# Patient Record
Sex: FEMALE | Race: WHITE | NOT HISPANIC OR LATINO | URBAN - METROPOLITAN AREA
[De-identification: names, ages, dates, MRNs, and addresses within clinical notes are randomized per-mention and may not be internally consistent; named-entity substitution may affect disease eponyms.]

---

## 2017-12-19 ENCOUNTER — OUTPATIENT (OUTPATIENT)
Dept: OUTPATIENT SERVICES | Facility: HOSPITAL | Age: 82
LOS: 1 days | End: 2017-12-19
Payer: MEDICARE

## 2017-12-19 PROCEDURE — 76641 ULTRASOUND BREAST COMPLETE: CPT

## 2017-12-19 PROCEDURE — 76641 ULTRASOUND BREAST COMPLETE: CPT | Mod: 26,50

## 2017-12-19 PROCEDURE — 77067 SCR MAMMO BI INCL CAD: CPT

## 2017-12-19 PROCEDURE — G0202: CPT | Mod: 26

## 2018-10-29 PROBLEM — Z00.00 ENCOUNTER FOR PREVENTIVE HEALTH EXAMINATION: Status: ACTIVE | Noted: 2018-10-29

## 2018-12-14 ENCOUNTER — OUTPATIENT (OUTPATIENT)
Dept: OUTPATIENT SERVICES | Facility: HOSPITAL | Age: 83
LOS: 1 days | End: 2018-12-14
Payer: MEDICARE

## 2018-12-14 ENCOUNTER — APPOINTMENT (OUTPATIENT)
Dept: MAMMOGRAPHY | Facility: HOSPITAL | Age: 83
End: 2018-12-14

## 2018-12-14 ENCOUNTER — APPOINTMENT (OUTPATIENT)
Dept: ULTRASOUND IMAGING | Facility: HOSPITAL | Age: 83
End: 2018-12-14

## 2018-12-14 PROCEDURE — 76641 ULTRASOUND BREAST COMPLETE: CPT | Mod: 26,50

## 2018-12-14 PROCEDURE — 77063 BREAST TOMOSYNTHESIS BI: CPT | Mod: 26

## 2018-12-14 PROCEDURE — 77067 SCR MAMMO BI INCL CAD: CPT

## 2018-12-14 PROCEDURE — 77067 SCR MAMMO BI INCL CAD: CPT | Mod: 26

## 2018-12-14 PROCEDURE — 76641 ULTRASOUND BREAST COMPLETE: CPT

## 2018-12-14 PROCEDURE — 77063 BREAST TOMOSYNTHESIS BI: CPT

## 2019-12-18 ENCOUNTER — APPOINTMENT (OUTPATIENT)
Dept: MAMMOGRAPHY | Facility: HOSPITAL | Age: 84
End: 2019-12-18
Payer: MEDICARE

## 2019-12-18 ENCOUNTER — OUTPATIENT (OUTPATIENT)
Dept: OUTPATIENT SERVICES | Facility: HOSPITAL | Age: 84
LOS: 1 days | End: 2019-12-18
Payer: MEDICARE

## 2019-12-18 ENCOUNTER — APPOINTMENT (OUTPATIENT)
Dept: ULTRASOUND IMAGING | Facility: HOSPITAL | Age: 84
End: 2019-12-18
Payer: MEDICARE

## 2019-12-18 PROCEDURE — 76641 ULTRASOUND BREAST COMPLETE: CPT | Mod: 26,50

## 2019-12-18 PROCEDURE — 77063 BREAST TOMOSYNTHESIS BI: CPT | Mod: 26

## 2019-12-18 PROCEDURE — 77067 SCR MAMMO BI INCL CAD: CPT | Mod: 26

## 2019-12-18 PROCEDURE — 76641 ULTRASOUND BREAST COMPLETE: CPT

## 2019-12-18 PROCEDURE — 77067 SCR MAMMO BI INCL CAD: CPT

## 2019-12-18 PROCEDURE — 77063 BREAST TOMOSYNTHESIS BI: CPT

## 2020-02-12 ENCOUNTER — APPOINTMENT (OUTPATIENT)
Dept: BREAST CENTER | Facility: CLINIC | Age: 85
End: 2020-02-12
Payer: MEDICARE

## 2020-02-12 VITALS
WEIGHT: 145 LBS | HEART RATE: 65 BPM | SYSTOLIC BLOOD PRESSURE: 122 MMHG | DIASTOLIC BLOOD PRESSURE: 78 MMHG | HEIGHT: 66 IN | TEMPERATURE: 97.3 F | BODY MASS INDEX: 23.3 KG/M2

## 2020-02-12 VITALS — OXYGEN SATURATION: 96 % | HEART RATE: 75 BPM

## 2020-02-12 DIAGNOSIS — Z78.9 OTHER SPECIFIED HEALTH STATUS: ICD-10-CM

## 2020-02-12 PROCEDURE — 99213 OFFICE O/P EST LOW 20 MIN: CPT

## 2020-02-12 RX ORDER — LEVOTHYROXINE SODIUM 0.07 MG/1
75 TABLET ORAL
Qty: 90 | Refills: 0 | Status: ACTIVE | COMMUNITY
Start: 2019-10-18

## 2020-02-12 RX ORDER — RIVAROXABAN 20 MG/1
20 TABLET, FILM COATED ORAL
Qty: 30 | Refills: 0 | Status: ACTIVE | COMMUNITY
Start: 2019-12-27

## 2020-02-14 NOTE — ASSESSMENT
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 Hogan; she is on AI but not managed by a medical oncologist. She is without complaint. Most recent breast imaging was benign; she has not had a breast MRI since before surgery in 2000. \par \par I explained to Wilda that she should meet with a medical oncologist, as Dr. Hogan is no longer in practice, to discuss her AI therapy. She was provided with the information for Dr. Justice as well as Dr. Wetzel & Dr. Ivey. \par \par I discussed the benefits on MRI surveillance with Wilda & her daughter; the patient wants to think some more about getting an MRI before making a final decision. The test was ordered, and she was instructed to call me with her decision. \par \par I discussed the benefits of genetic testing; she has elected to not go through with testing today, but will think about it and let me know if she decides to move forward with testing in the future.

## 2020-02-14 NOTE — PHYSICAL EXAM
[No Supraclavicular Adenopathy] : no supraclavicular adenopathy [Examined in the supine and seated position] : examined in the supine and seated position [No dominant masses] : no dominant masses in right breast  [No dominant masses] : no dominant masses left breast [Symmetrical] : symmetrical [No Nipple Retraction] : no left nipple retraction [No Nipple Discharge] : no left nipple discharge [No Axillary Lymphadenopathy] : no left axillary lymphadenopathy [No Rashes] : no rashes [No Ulceration] : no ulceration [Breast Nipple Fissures] : nipples not fissured [Breast Nipple Inversion] : nipples not inverted [Breast Nipple Retraction] : nipples not retracted [Breast Nipple Flattening] : nipples not flattened [de-identified] : TASHIA [de-identified] : healed lumpectomy scar to the UOQ

## 2020-02-14 NOTE — PAST MEDICAL HISTORY
[Menopause Age____] : age at menopause was [unfilled] [Postmenopausal] : The patient is postmenopausal [Approximately ___] : the LMP was approximately [unfilled] [Total Preg ___] : G[unfilled] [Live Births ___] : P[unfilled]  [AB Spont ___] : miscarriages: [unfilled]  [Age At Live Birth ___] : Age at live birth: [unfilled] [Menarche Age ____] : age at menarche was [unfilled] [History of Hormone Replacement Treatment] : has no history of hormone replacement treatment [FreeTextEntry2] : Gave birth to fraternal twins [FreeTextEntry5] : None [FreeTextEntry6] : None [FreeTextEntry7] : None [FreeTextEntry8] : 3 months

## 2020-02-14 NOTE — DATA REVIEWED
[FreeTextEntry1] : --12/18/19 ([facility]) b/l breast mmg & US: heterogenously dense breasts. Benign mmg findings w/o interval change. Negative b/l US. BI-RADS 2.

## 2020-02-14 NOTE — REASON FOR VISIT
[Consultation] : a consultation visit [Other: _____] : [unfilled] [FreeTextEntry1] : history of left breast cancer s/p lumpectomy in 2000.

## 2020-02-14 NOTE — HISTORY OF PRESENT ILLNESS
[FreeTextEntry1] : Wilda is an 85 yo postmenopausal female who presents for a history of left breast DCIS, ER+, s/p lumpectomy & XRT in 2000. She is currently taking Letrozole; previously prescribed by Dr. Hogan, currently prescribed by her PCP. She reports some "bone issues" and back pain and is wondering if this can be discontinued. She has not seen a medical oncologist in > 1 year. She denies ever having completing genetic testing. She states that she has not completed a breast MRI since before her surgery in 2000.

## 2021-01-13 ENCOUNTER — APPOINTMENT (OUTPATIENT)
Dept: ULTRASOUND IMAGING | Facility: HOSPITAL | Age: 86
End: 2021-01-13
Payer: MEDICARE

## 2021-01-13 ENCOUNTER — APPOINTMENT (OUTPATIENT)
Dept: MAMMOGRAPHY | Facility: HOSPITAL | Age: 86
End: 2021-01-13
Payer: MEDICARE

## 2021-01-13 ENCOUNTER — OUTPATIENT (OUTPATIENT)
Dept: OUTPATIENT SERVICES | Facility: HOSPITAL | Age: 86
LOS: 1 days | End: 2021-01-13
Payer: MEDICARE

## 2021-01-13 ENCOUNTER — RESULT REVIEW (OUTPATIENT)
Age: 86
End: 2021-01-13

## 2021-01-13 PROCEDURE — 77067 SCR MAMMO BI INCL CAD: CPT | Mod: 26

## 2021-01-13 PROCEDURE — 77063 BREAST TOMOSYNTHESIS BI: CPT | Mod: 26

## 2021-01-13 PROCEDURE — 76641 ULTRASOUND BREAST COMPLETE: CPT | Mod: 26,50

## 2021-01-13 PROCEDURE — 77067 SCR MAMMO BI INCL CAD: CPT

## 2021-01-13 PROCEDURE — 76641 ULTRASOUND BREAST COMPLETE: CPT

## 2021-01-13 PROCEDURE — 77063 BREAST TOMOSYNTHESIS BI: CPT

## 2021-01-19 ENCOUNTER — NON-APPOINTMENT (OUTPATIENT)
Age: 86
End: 2021-01-19

## 2021-02-01 ENCOUNTER — NON-APPOINTMENT (OUTPATIENT)
Age: 86
End: 2021-02-01

## 2021-02-04 ENCOUNTER — NON-APPOINTMENT (OUTPATIENT)
Age: 86
End: 2021-02-04

## 2021-02-12 ENCOUNTER — APPOINTMENT (OUTPATIENT)
Dept: BREAST CENTER | Facility: CLINIC | Age: 86
End: 2021-02-12
Payer: MEDICARE

## 2021-02-12 VITALS — HEIGHT: 66 IN | WEIGHT: 145 LBS | BODY MASS INDEX: 23.3 KG/M2 | OXYGEN SATURATION: 98 % | HEART RATE: 84 BPM

## 2021-02-12 PROCEDURE — 99213 OFFICE O/P EST LOW 20 MIN: CPT

## 2021-02-12 RX ORDER — LETROZOLE TABLETS 2.5 MG/1
2.5 TABLET, FILM COATED ORAL
Qty: 30 | Refills: 0 | Status: COMPLETED | COMMUNITY
Start: 2019-12-10 | End: 2020-02-12

## 2021-02-12 NOTE — REASON FOR VISIT
[Follow-Up: _____] : a [unfilled] follow-up visit [FreeTextEntry1] : history of left breast cancer s/p lumpectomy in 2000.

## 2021-02-12 NOTE — ASSESSMENT
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 She is without complaint. Most recent breast imaging was benign; give her age and that her dx was > 20 years ago, it was decided to not do MRI for screenings. Will have her proceed with b/l mmg and US in Jan 2022 and RTC after.

## 2021-02-12 NOTE — PAST MEDICAL HISTORY
[Postmenopausal] : The patient is postmenopausal [Menarche Age ____] : age at menarche was [unfilled] [Menopause Age____] : age at menopause was [unfilled] [Approximately ___] : the LMP was approximately [unfilled] [Total Preg ___] : G[unfilled] [Live Births ___] : P[unfilled]  [AB Spont ___] : miscarriages: [unfilled]  [Age At Live Birth ___] : Age at live birth: [unfilled] [History of Hormone Replacement Treatment] : has no history of hormone replacement treatment [FreeTextEntry5] : None [FreeTextEntry2] : Gave birth to fraternal twins [FreeTextEntry6] : None [FreeTextEntry7] : None [FreeTextEntry8] : 3 months

## 2021-02-12 NOTE — PHYSICAL EXAM
[No Supraclavicular Adenopathy] : no supraclavicular adenopathy [Examined in the supine and seated position] : examined in the supine and seated position [Symmetrical] : symmetrical [No dominant masses] : no dominant masses in right breast  [No dominant masses] : no dominant masses left breast [No Nipple Retraction] : no left nipple retraction [No Nipple Discharge] : no left nipple discharge [No Axillary Lymphadenopathy] : no left axillary lymphadenopathy [No Rashes] : no rashes [No Ulceration] : no ulceration [Breast Nipple Inversion] : nipples not inverted [Breast Nipple Retraction] : nipples not retracted [Breast Nipple Flattening] : nipples not flattened [Breast Nipple Fissures] : nipples not fissured [de-identified] : Pectus deformity, per patient, this is her baseline (congenital)  [de-identified] : TASHIA [de-identified] : healed lumpectomy scar to the UOQ

## 2021-02-12 NOTE — HISTORY OF PRESENT ILLNESS
[FreeTextEntry1] : Wilda is an 84 yo postmenopausal female who presents for follow-up for a history of left breast DCIS, ER+, s/p lumpectomy & XRT in 2000. She is is s/p a 20 year course of Letrozole, completed last year. She is without complaint, reports that she has receive the COVID-19 vaccine; most recent breast imaging was benign.

## 2021-02-12 NOTE — DATA REVIEWED
[FreeTextEntry1] : --12/18/19 ([facility]) b/l breast mmg & US: heterogenously dense breasts. Benign mmg findings w/o interval change. Negative b/l US. BI-RADS 2. \par \par -- 1/13/21 ([facility]) b/l mmg & US: benign mmg w/o interval change; negative b/l US. BI-RADS 2.

## 2022-01-18 ENCOUNTER — RESULT REVIEW (OUTPATIENT)
Age: 87
End: 2022-01-18

## 2022-01-18 ENCOUNTER — APPOINTMENT (OUTPATIENT)
Dept: MAMMOGRAPHY | Facility: HOSPITAL | Age: 87
End: 2022-01-18
Payer: MEDICARE

## 2022-01-18 ENCOUNTER — OUTPATIENT (OUTPATIENT)
Dept: OUTPATIENT SERVICES | Facility: HOSPITAL | Age: 87
LOS: 1 days | End: 2022-01-18
Payer: MEDICARE

## 2022-01-18 ENCOUNTER — APPOINTMENT (OUTPATIENT)
Dept: ULTRASOUND IMAGING | Facility: HOSPITAL | Age: 87
End: 2022-01-18
Payer: MEDICARE

## 2022-01-18 PROCEDURE — 76641 ULTRASOUND BREAST COMPLETE: CPT

## 2022-01-18 PROCEDURE — 77065 DX MAMMO INCL CAD UNI: CPT | Mod: 26,GG,RT

## 2022-01-18 PROCEDURE — 77063 BREAST TOMOSYNTHESIS BI: CPT

## 2022-01-18 PROCEDURE — 76641 ULTRASOUND BREAST COMPLETE: CPT | Mod: 26,50

## 2022-01-18 PROCEDURE — 77065 DX MAMMO INCL CAD UNI: CPT

## 2022-01-18 PROCEDURE — 77063 BREAST TOMOSYNTHESIS BI: CPT | Mod: 26,59

## 2022-01-18 PROCEDURE — 77067 SCR MAMMO BI INCL CAD: CPT | Mod: 26,59

## 2022-01-18 PROCEDURE — 77067 SCR MAMMO BI INCL CAD: CPT

## 2022-01-19 ENCOUNTER — NON-APPOINTMENT (OUTPATIENT)
Age: 87
End: 2022-01-19

## 2022-01-19 DIAGNOSIS — R92.8 OTHER ABNORMAL AND INCONCLUSIVE FINDINGS ON DIAGNOSTIC IMAGING OF BREAST: ICD-10-CM

## 2022-01-26 ENCOUNTER — APPOINTMENT (OUTPATIENT)
Dept: MAMMOGRAPHY | Facility: HOSPITAL | Age: 87
End: 2022-01-26
Payer: MEDICARE

## 2022-01-26 ENCOUNTER — RESULT REVIEW (OUTPATIENT)
Age: 87
End: 2022-01-26

## 2022-01-26 ENCOUNTER — OUTPATIENT (OUTPATIENT)
Dept: OUTPATIENT SERVICES | Facility: HOSPITAL | Age: 87
LOS: 1 days | End: 2022-01-26
Payer: MEDICARE

## 2022-01-26 PROCEDURE — 76098 X-RAY EXAM SURGICAL SPECIMEN: CPT | Mod: 26

## 2022-01-26 PROCEDURE — 88341 IMHCHEM/IMCYTCHM EA ADD ANTB: CPT | Mod: 26,59

## 2022-01-26 PROCEDURE — 88305 TISSUE EXAM BY PATHOLOGIST: CPT | Mod: 26

## 2022-01-26 PROCEDURE — 88342 IMHCHEM/IMCYTCHM 1ST ANTB: CPT | Mod: 26,59

## 2022-01-26 PROCEDURE — 19081 BX BREAST 1ST LESION STRTCTC: CPT

## 2022-01-26 PROCEDURE — 88305 TISSUE EXAM BY PATHOLOGIST: CPT

## 2022-01-26 PROCEDURE — 77065 DX MAMMO INCL CAD UNI: CPT | Mod: 26,RT

## 2022-01-26 PROCEDURE — 88344 IMHCHEM/IMCYTCHM EA MLT ANTB: CPT

## 2022-01-26 PROCEDURE — 19081 BX BREAST 1ST LESION STRTCTC: CPT | Mod: RT

## 2022-01-26 PROCEDURE — 77065 DX MAMMO INCL CAD UNI: CPT

## 2022-01-26 PROCEDURE — 88360 TUMOR IMMUNOHISTOCHEM/MANUAL: CPT

## 2022-01-26 PROCEDURE — 88344 IMHCHEM/IMCYTCHM EA MLT ANTB: CPT | Mod: 26,59

## 2022-01-26 PROCEDURE — 88341 IMHCHEM/IMCYTCHM EA ADD ANTB: CPT

## 2022-01-26 PROCEDURE — A4648: CPT

## 2022-01-26 PROCEDURE — 88342 IMHCHEM/IMCYTCHM 1ST ANTB: CPT

## 2022-01-26 PROCEDURE — 88360 TUMOR IMMUNOHISTOCHEM/MANUAL: CPT | Mod: 26

## 2022-01-28 LAB — SURGICAL PATHOLOGY STUDY: SIGNIFICANT CHANGE UP

## 2022-01-31 ENCOUNTER — APPOINTMENT (OUTPATIENT)
Dept: BREAST CENTER | Facility: CLINIC | Age: 87
End: 2022-01-31
Payer: MEDICARE

## 2022-01-31 ENCOUNTER — NON-APPOINTMENT (OUTPATIENT)
Age: 87
End: 2022-01-31

## 2022-01-31 VITALS — BODY MASS INDEX: 23.46 KG/M2 | HEART RATE: 97 BPM | OXYGEN SATURATION: 99 % | HEIGHT: 66 IN | WEIGHT: 146 LBS

## 2022-01-31 DIAGNOSIS — D05.11 INTRADUCTAL CARCINOMA IN SITU OF RIGHT BREAST: ICD-10-CM

## 2022-01-31 PROCEDURE — 99214 OFFICE O/P EST MOD 30 MIN: CPT

## 2022-02-01 NOTE — DATA REVIEWED
[FreeTextEntry1] : --12/18/19 ([facility]) b/l breast mmg & US: heterogenously dense breasts. Benign mmg findings w/o interval change. Negative b/l US. BI-RADS 2. \par \par -- 1/13/21 ([facility]) b/l mmg & US: benign mmg w/o interval change; negative b/l US. BI-RADS 2. \par \par 1/18/22 ([facility]) heterogenously dense. New suspicious linear calcifications right breast. BI-RADS 4B.\par \par 1/26/22 ([facility]) Right breast  12:00, posterior, stereotactic core biopsy: Ductal carcinoma in situ, high nuclear grade, with foci highly suspicious for microinvasion, with central comedo-type necrosis and associated microcalcifications\par ADDENDUM: ESTROGEN RECEPTOR:  NEGATIVE  ( 0 STAINING)\par PROGESTERONE RECEPTOR: NEGATIVE  (0 STAINING)\par HER-2: NEGATIVE  \par \par

## 2022-02-01 NOTE — PHYSICAL EXAM
[No Supraclavicular Adenopathy] : no supraclavicular adenopathy [Examined in the supine and seated position] : examined in the supine and seated position [Symmetrical] : symmetrical [No dominant masses] : no dominant masses in right breast  [No dominant masses] : no dominant masses left breast [No Nipple Retraction] : no left nipple retraction [No Nipple Discharge] : no left nipple discharge [No Axillary Lymphadenopathy] : no left axillary lymphadenopathy [No Rashes] : no rashes [No Ulceration] : no ulceration [Breast Nipple Inversion] : nipples not inverted [Breast Nipple Retraction] : nipples not retracted [Breast Nipple Flattening] : nipples not flattened [Breast Nipple Fissures] : nipples not fissured [de-identified] : Pectus deformity, per patient, this is her baseline (congenital)  [de-identified] : TASHIA [de-identified] : healed lumpectomy scar to the UOQ

## 2022-02-01 NOTE — HISTORY OF PRESENT ILLNESS
[FreeTextEntry1] : Wilda is an 85 yo postmenopausal female who presents for newly diagnosed right breast DCIS, suspicious for microinvasion, (ER- [place]- HER2 -?)found on screening mammogram. Pt denies palpable masses, skin lesions/changes, nipple discharge, or other breast complaints.\par \par The patient has a history of left breast DCIS, ER+, s/p lumpectomy & XRT in 2000. She is is s/p a 20 year course of Letrozole, completed in 2020. \par \par Pre-op & post-op education, as well as information regarding medical clearance, was provided and reviewed with Wilda and her daughter. All questions were answered; they verbalized understanding of the information.\par \par Discussed patient's new diagnosis of breast cancer. Discussed local therapy which would include breast and axillary surgery, depending on results of contrast enhanced mammogram. Discussed the possibility of radiation therapy if breast conservation is pursued based on radiation oncology recommendations. Discussed option for mastectomy and contralateral prophylactic mastectomy depending on MRI and genetic testing results. Also discussed systemic therapy depending on final pathology and medical oncology recommendations. Finally discussed the risk of upstaging to invasive cancer, therefore going forward with sentinel lymph node biospy. Patient expressed understanding.\par

## 2022-02-04 ENCOUNTER — APPOINTMENT (OUTPATIENT)
Dept: BREAST CENTER | Facility: CLINIC | Age: 87
End: 2022-02-04

## 2022-02-07 ENCOUNTER — RESULT REVIEW (OUTPATIENT)
Age: 87
End: 2022-02-07

## 2022-02-07 ENCOUNTER — APPOINTMENT (OUTPATIENT)
Dept: MAMMOGRAPHY | Facility: HOSPITAL | Age: 87
End: 2022-02-07
Payer: MEDICARE

## 2022-02-07 ENCOUNTER — OUTPATIENT (OUTPATIENT)
Dept: OUTPATIENT SERVICES | Facility: HOSPITAL | Age: 87
LOS: 1 days | End: 2022-02-07
Payer: MEDICARE

## 2022-02-07 ENCOUNTER — APPOINTMENT (OUTPATIENT)
Dept: ULTRASOUND IMAGING | Facility: HOSPITAL | Age: 87
End: 2022-02-07
Payer: MEDICARE

## 2022-02-07 LAB — POCT ISTAT CREATININE: 1.2 MG/DL — SIGNIFICANT CHANGE UP (ref 0.5–1.3)

## 2022-02-07 PROCEDURE — G0279: CPT | Mod: 26

## 2022-02-07 PROCEDURE — 77066 DX MAMMO INCL CAD BI: CPT | Mod: 26

## 2022-02-07 PROCEDURE — 76642 ULTRASOUND BREAST LIMITED: CPT

## 2022-02-07 PROCEDURE — 76642 ULTRASOUND BREAST LIMITED: CPT | Mod: 26,50

## 2022-02-07 PROCEDURE — 82565 ASSAY OF CREATININE: CPT

## 2022-02-07 PROCEDURE — 77066 DX MAMMO INCL CAD BI: CPT

## 2022-02-07 PROCEDURE — G0279: CPT

## 2022-02-10 ENCOUNTER — NON-APPOINTMENT (OUTPATIENT)
Age: 87
End: 2022-02-10

## 2022-02-14 ENCOUNTER — APPOINTMENT (OUTPATIENT)
Dept: BREAST CENTER | Facility: CLINIC | Age: 87
End: 2022-02-14
Payer: MEDICARE

## 2022-02-14 VITALS — BODY MASS INDEX: 23.46 KG/M2 | OXYGEN SATURATION: 98 % | WEIGHT: 146 LBS | HEIGHT: 66 IN | HEART RATE: 76 BPM

## 2022-02-14 PROCEDURE — 99214 OFFICE O/P EST MOD 30 MIN: CPT

## 2022-02-15 NOTE — DATA REVIEWED
[FreeTextEntry1] : --12/18/19 ([facility]) b/l breast mmg & US: heterogenously dense breasts. Benign mmg findings w/o interval change. Negative b/l US. BI-RADS 2. \par \par -- 1/13/21 ([facility]) b/l mmg & US: benign mmg w/o interval change; negative b/l US. BI-RADS 2. \par \par 1/18/22 ([facility]) heterogenously dense. New suspicious linear calcifications right breast. BI-RADS 4B.\par \par 1/26/22 ([facility]) Right breast  12:00, posterior, stereotactic core biopsy: Ductal carcinoma in situ, high nuclear grade, with foci highly suspicious for microinvasion, with central comedo-type necrosis and associated microcalcifications\par ADDENDUM: ESTROGEN RECEPTOR:  NEGATIVE  ( 0 STAINING)\par PROGESTERONE RECEPTOR: NEGATIVE  (0 STAINING)\par HER-2: NEGATIVE  \par ADDENDUM: Deeper levels and immunohistochemical stains were ordered on slide 1B to rule out microinvasion. However, on the deeper levels some of the ducts, which had previously appeared to have ductal carcinoma in situ, are now more irregularly shaped and invasive-appearing.  Immunohistochemistry for p63, SMMH and MSA confirms the absence of myoepithelial cells in these irregular ducts and thus they are invasive carcinoma. Other ducts remain as in situ carcinoma. Right breast, stereotactic core biopsy: INVASIVE DUCTAL CARCINOMA, HIGH GRADE, 4 MM ON THIS SLIDE\par \par 2/7/22 ([facility])b/l mmg & US:  heterogenously dense. 1.  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  Needle localization can be performed with mammography guidance. 2.  Unremarkable bilateral contrast mammography exam. 3.  No evidence of adenopathy in either axilla. BI-RADS 6. \par \par 2/9/22 ([facility]) genetic testing: negative.

## 2022-02-15 NOTE — PHYSICAL EXAM
[No Supraclavicular Adenopathy] : no supraclavicular adenopathy [Examined in the supine and seated position] : examined in the supine and seated position [Symmetrical] : symmetrical [No dominant masses] : no dominant masses in right breast  [No dominant masses] : no dominant masses left breast [No Nipple Retraction] : no left nipple retraction [No Nipple Discharge] : no left nipple discharge [No Axillary Lymphadenopathy] : no left axillary lymphadenopathy [No Rashes] : no rashes [No Ulceration] : no ulceration [Breast Nipple Inversion] : nipples not inverted [Breast Nipple Retraction] : nipples not retracted [Breast Nipple Flattening] : nipples not flattened [Breast Nipple Fissures] : nipples not fissured [de-identified] : Pectus deformity, per patient, this is her baseline (congenital)  [de-identified] : TASHIA [de-identified] : healed lumpectomy scar to the UOQ

## 2022-02-15 NOTE — HISTORY OF PRESENT ILLNESS
[FreeTextEntry1] : Wilda is an 85 yo postmenopausal female who presents for newly diagnosed right breast invasive ductal carcinoma (ER- [place]- HER2 -)found on screening mammogram. Pt denies palpable masses, skin lesions/changes, nipple discharge, or other breast complaints.\par \par The patient has a history of left breast DCIS, ER+, s/p lumpectomy & XRT in 2000. She is is s/p a 20 year course of Letrozole, completed in 2020. \par \par Pre-op & post-op education, as well as information regarding medical clearance, was provided and reviewed with Wilda and her daughter. All questions were answered; they verbalized understanding of the information.\par \par Discussed patient's new diagnosis of breast cancer. Discussed local therapy which would include breast and axillary surgery, depending on results of contrast enhanced mammogram. Discussed the possibility of radiation therapy if breast conservation is pursued based on radiation oncology recommendations. Discussed option for mastectomy and contralateral prophylactic mastectomy depending on MRI and genetic testing results. Also discussed systemic therapy depending on final pathology and medical oncology recommendations. Finally discussed the risk of upstaging to invasive cancer, therefore going forward with sentinel lymph node biopsy. Patient expressed understanding.\par \par Discussed risks, benefits and alternatives of right lumpectomy and right SLN. Risks including infection, hematoma, seroma formation, as well as infection, and wound complications, risk of need for further procedure. Patient understood risks and benefit and would like to go forward with the procedure.\par

## 2022-02-24 ENCOUNTER — APPOINTMENT (OUTPATIENT)
Dept: MAMMOGRAPHY | Facility: HOSPITAL | Age: 87
End: 2022-02-24
Payer: MEDICARE

## 2022-02-24 ENCOUNTER — RESULT REVIEW (OUTPATIENT)
Age: 87
End: 2022-02-24

## 2022-02-24 ENCOUNTER — OUTPATIENT (OUTPATIENT)
Dept: OUTPATIENT SERVICES | Facility: HOSPITAL | Age: 87
LOS: 1 days | End: 2022-02-24
Payer: MEDICARE

## 2022-02-24 ENCOUNTER — NON-APPOINTMENT (OUTPATIENT)
Age: 87
End: 2022-02-24

## 2022-02-24 PROCEDURE — C1739: CPT

## 2022-02-24 PROCEDURE — 19281 PERQ DEVICE BREAST 1ST IMAG: CPT

## 2022-02-24 PROCEDURE — 19281 PERQ DEVICE BREAST 1ST IMAG: CPT | Mod: RT

## 2022-02-26 ENCOUNTER — LABORATORY RESULT (OUTPATIENT)
Age: 87
End: 2022-02-26

## 2022-02-28 ENCOUNTER — RESULT REVIEW (OUTPATIENT)
Age: 87
End: 2022-02-28

## 2022-02-28 ENCOUNTER — TRANSCRIPTION ENCOUNTER (OUTPATIENT)
Age: 87
End: 2022-02-28

## 2022-02-28 ENCOUNTER — OUTPATIENT (OUTPATIENT)
Dept: OUTPATIENT SERVICES | Facility: HOSPITAL | Age: 87
LOS: 1 days | End: 2022-02-28
Payer: MEDICARE

## 2022-02-28 VITALS
DIASTOLIC BLOOD PRESSURE: 80 MMHG | TEMPERATURE: 98 F | WEIGHT: 145.51 LBS | HEIGHT: 66.14 IN | SYSTOLIC BLOOD PRESSURE: 117 MMHG | OXYGEN SATURATION: 96 % | RESPIRATION RATE: 16 BRPM

## 2022-02-28 DIAGNOSIS — Z98.890 OTHER SPECIFIED POSTPROCEDURAL STATES: Chronic | ICD-10-CM

## 2022-02-28 DIAGNOSIS — E89.0 POSTPROCEDURAL HYPOTHYROIDISM: Chronic | ICD-10-CM

## 2022-02-28 PROCEDURE — 78195 LYMPH SYSTEM IMAGING: CPT | Mod: MG

## 2022-02-28 PROCEDURE — A9541: CPT

## 2022-02-28 PROCEDURE — 78195 LYMPH SYSTEM IMAGING: CPT | Mod: 26,MG

## 2022-02-28 PROCEDURE — G1004: CPT

## 2022-02-28 RX ORDER — FUROSEMIDE 20 MG/1
20 TABLET ORAL
Qty: 15 | Refills: 0 | Status: DISCONTINUED | COMMUNITY
Start: 2019-08-23 | End: 2022-02-28

## 2022-02-28 RX ORDER — ALENDRONATE SODIUM 70 MG/1
70 TABLET ORAL
Qty: 12 | Refills: 0 | Status: DISCONTINUED | COMMUNITY
Start: 2019-10-20 | End: 2022-02-28

## 2022-02-28 RX ORDER — AMOXICILLIN AND CLAVULANATE POTASSIUM 500; 125 MG/1; MG/1
500-125 TABLET, FILM COATED ORAL
Qty: 20 | Refills: 0 | Status: DISCONTINUED | COMMUNITY
Start: 2019-10-28 | End: 2022-02-28

## 2022-02-28 NOTE — ASU PATIENT PROFILE, ADULT - NSICDXPASTMEDICALHX_GEN_ALL_CORE_FT
PAST MEDICAL HISTORY:  Afib     Breast cancer     H/O hyperparathyroidism     HTN (hypertension)

## 2022-03-01 ENCOUNTER — APPOINTMENT (OUTPATIENT)
Dept: BREAST CENTER | Facility: CLINIC | Age: 87
End: 2022-03-01
Payer: MEDICARE

## 2022-03-01 ENCOUNTER — RESULT REVIEW (OUTPATIENT)
Age: 87
End: 2022-03-01

## 2022-03-01 ENCOUNTER — OUTPATIENT (OUTPATIENT)
Dept: OUTPATIENT SERVICES | Facility: HOSPITAL | Age: 87
LOS: 1 days | Discharge: HOME | End: 2022-03-01
Payer: MEDICARE

## 2022-03-01 ENCOUNTER — TRANSCRIPTION ENCOUNTER (OUTPATIENT)
Age: 87
End: 2022-03-01

## 2022-03-01 VITALS — OXYGEN SATURATION: 91 % | SYSTOLIC BLOOD PRESSURE: 151 MMHG | DIASTOLIC BLOOD PRESSURE: 95 MMHG | TEMPERATURE: 97 F

## 2022-03-01 DIAGNOSIS — Z98.890 OTHER SPECIFIED POSTPROCEDURAL STATES: Chronic | ICD-10-CM

## 2022-03-01 DIAGNOSIS — E89.0 POSTPROCEDURAL HYPOTHYROIDISM: Chronic | ICD-10-CM

## 2022-03-01 LAB
APTT BLD: 30.9 SEC — SIGNIFICANT CHANGE UP (ref 27.5–35.5)
INR BLD: 1.01 — SIGNIFICANT CHANGE UP (ref 0.88–1.16)
PROTHROM AB SERPL-ACNC: 12 SEC — SIGNIFICANT CHANGE UP (ref 10.5–13.4)

## 2022-03-01 PROCEDURE — 38525 BIOPSY/REMOVAL LYMPH NODES: CPT | Mod: RT

## 2022-03-01 PROCEDURE — 36415 COLL VENOUS BLD VENIPUNCTURE: CPT

## 2022-03-01 PROCEDURE — 88344 IMHCHEM/IMCYTCHM EA MLT ANTB: CPT | Mod: 26

## 2022-03-01 PROCEDURE — 85730 THROMBOPLASTIN TIME PARTIAL: CPT

## 2022-03-01 PROCEDURE — 19301 PARTIAL MASTECTOMY: CPT | Mod: RT

## 2022-03-01 PROCEDURE — 88341 IMHCHEM/IMCYTCHM EA ADD ANTB: CPT

## 2022-03-01 PROCEDURE — 88344 IMHCHEM/IMCYTCHM EA MLT ANTB: CPT

## 2022-03-01 PROCEDURE — 76098 X-RAY EXAM SURGICAL SPECIMEN: CPT

## 2022-03-01 PROCEDURE — 88305 TISSUE EXAM BY PATHOLOGIST: CPT | Mod: 26

## 2022-03-01 PROCEDURE — 38900 IO MAP OF SENT LYMPH NODE: CPT | Mod: RT

## 2022-03-01 PROCEDURE — 88342 IMHCHEM/IMCYTCHM 1ST ANTB: CPT | Mod: 26,59

## 2022-03-01 PROCEDURE — 85610 PROTHROMBIN TIME: CPT

## 2022-03-01 PROCEDURE — 76098 X-RAY EXAM SURGICAL SPECIMEN: CPT | Mod: 26

## 2022-03-01 PROCEDURE — 88305 TISSUE EXAM BY PATHOLOGIST: CPT

## 2022-03-01 PROCEDURE — 0546T RF SPECTRSC NTRAOP MRGN ASMT: CPT | Mod: RT

## 2022-03-01 PROCEDURE — 88341 IMHCHEM/IMCYTCHM EA ADD ANTB: CPT | Mod: 26,59

## 2022-03-01 PROCEDURE — 88307 TISSUE EXAM BY PATHOLOGIST: CPT

## 2022-03-01 PROCEDURE — 88307 TISSUE EXAM BY PATHOLOGIST: CPT | Mod: 26

## 2022-03-01 PROCEDURE — 19125 EXCISION BREAST LESION: CPT | Mod: RT

## 2022-03-01 PROCEDURE — 38900 IO MAP OF SENT LYMPH NODE: CPT

## 2022-03-01 RX ORDER — METOPROLOL TARTRATE 50 MG
1 TABLET ORAL
Qty: 0 | Refills: 0 | DISCHARGE

## 2022-03-01 RX ORDER — ACETAMINOPHEN 500 MG
1000 TABLET ORAL ONCE
Refills: 0 | Status: COMPLETED | OUTPATIENT
Start: 2022-03-01 | End: 2022-03-01

## 2022-03-01 RX ORDER — ACETAMINOPHEN 500 MG
2 TABLET ORAL
Qty: 0 | Refills: 0 | DISCHARGE

## 2022-03-01 RX ORDER — CANDESARTAN CILEXETIL 8 MG/1
1 TABLET ORAL
Qty: 0 | Refills: 0 | DISCHARGE

## 2022-03-01 RX ORDER — CHOLECALCIFEROL (VITAMIN D3) 125 MCG
1 CAPSULE ORAL
Qty: 0 | Refills: 0 | DISCHARGE

## 2022-03-01 RX ORDER — RIVAROXABAN 15 MG-20MG
1 KIT ORAL
Qty: 0 | Refills: 0 | DISCHARGE

## 2022-03-01 RX ORDER — LEVOTHYROXINE SODIUM 125 MCG
1 TABLET ORAL
Qty: 0 | Refills: 0 | DISCHARGE

## 2022-03-01 RX ADMIN — Medication 400 MILLIGRAM(S): at 18:24

## 2022-03-01 NOTE — DISCHARGE NOTE NURSING/CASE MANAGEMENT/SOCIAL WORK - NSDCPEFALRISK_GEN_ALL_CORE
For information on Fall & Injury Prevention, visit: [email or web address] OR  [email or web address] OR  https://www.cdc.gov/steadi/patient.html

## 2022-03-01 NOTE — BRIEF OPERATIVE NOTE - OPERATION/FINDINGS
Under general anesthesia, After P&D, 8cm incision was made on upper lateral quadrant of right breast. Under probe guidance, superficial tumor was localized. Breast tissue including the tumor was undermined from overlying skin, dissected and removed. Hemostasis was achieved by electro-cautery. Tumor margins (superior, inferior, lateral, and medial) were also taken and sent for pathology. Surrounding breast tissues were re-approximated. Skin was closed by 3-0 Vicryl and 4-0 Monocryl sutures. Right axillary sentinel lymph node excision was also made after making 6cm incision and dissecting the sentinel lymph nodes using dye and probe guidance. Skin was closed by 3-0 Vicryl and 4-0 Monocryl sutures.

## 2022-03-01 NOTE — ASU DISCHARGE PLAN (ADULT/PEDIATRIC) - NO HEAVY LIFTING DURATION
Please avoid lifting weights over 5-10lbs, heavy exercises or strenuous activities for 6-8 weeks after your surgery

## 2022-03-01 NOTE — ASU DISCHARGE PLAN (ADULT/PEDIATRIC) - ASU DC SPECIAL INSTRUCTIONSFT
Please follow up with Dr. Haile outpatient. you may call the office to make an appointment at your earliest convenience, if you don't already have one.    Please take 2 tablets of Extra-Strength Tylenol (500mg) every 6 hours for 4-5 days, for pain control. Do NOT exceed 4,000 mg of Tylenol per 24 hours.  Please keep taking your home medications. Please restart taking Xarelto from tomorrow.  Please keep your dressing for 48 hours. You may take the dressing off and start taking showers after 2 days. Do NOT take off the Steri-strips over the incisions until your follow up visit. You may keep using the binder for longer time.    General Discharge Instructions:  Please resume all regular home medications unless specifically advised not to take a particular medication (restart Xarelto from tomorrow). Also, please take any new medications as prescribed.  Please get plenty of rest, continue to ambulate several times per day, and drink adequate amounts of fluids. Avoid lifting weights greater than 5-10 lbs until you follow-up with your surgeon, who will instruct you further regarding activity restrictions.  Avoid driving or operating heavy machinery while taking pain medications.  Please follow-up with your surgeon and Primary Care Provider (PCP) as advised.  Incision Care:  *Please call your doctor or nurse practitioner if you have increased pain, swelling, redness, or drainage from the incision site.  *Avoid swimming and baths until your follow-up appointment.  *Please keep dressing for 48 hours and start taking showers after. Do NOT take steri-strips off.    Warning Signs:  Please call your doctor or nurse practitioner if you experience the following:  *You experience new chest pain, pressure, squeezing or tightness.  *New or worsening cough, shortness of breath, or wheeze.  *If you are vomiting and cannot keep down fluids or your medications.  *You are getting dehydrated due to continued vomiting, diarrhea, or other reasons. Signs of dehydration include dry mouth, rapid heartbeat, or feeling dizzy or faint when standing.  *You see blood or dark/black material when you vomit or have a bowel movement.  *You experience burning when you urinate, have blood in your urine, or experience a discharge.  *Your pain is not improving within 8-12 hours or is not gone within 24 hours. Call or return immediately if your pain is getting worse, changes location, or moves to your chest or back.  *You have shaking chills, or fever greater than 101.5 degrees Fahrenheit or 38 degrees Celsius.  *Any change in your symptoms, or any new symptoms that concern you.

## 2022-03-01 NOTE — ASU DISCHARGE PLAN (ADULT/PEDIATRIC) - NS MD DC FALL RISK RISK
For information on Fall & Injury Prevention, visit: [email or web address] OR  [email or web address] OR  https://www.cdc.gov/steadi/patient.html

## 2022-03-01 NOTE — BRIEF OPERATIVE NOTE - SPECIMENS
Right breast cancer, right breast cancer margins (superior, inferior, lateral, and medial), right axillary sentinel lymph nodes

## 2022-03-01 NOTE — DISCHARGE NOTE NURSING/CASE MANAGEMENT/SOCIAL WORK - PATIENT PORTAL LINK FT
You can access the FollowMyHealth Patient Portal offered by [facility] by registering at the following website: [email or web address]. By joining [email or web address]’s FollowMyHealth portal, you will also be able to view your health information using other applications (apps) compatible with our system.

## 2022-03-01 NOTE — ASU DISCHARGE PLAN (ADULT/PEDIATRIC) - MEDICATION INSTRUCTIONS
Subjective   Patient is a 34 y.o. male presenting with abdominal pain.   Abdominal Pain   Pain location:  Epigastric and RLQ  Pain quality: sharp    Pain severity:  Moderate  Duration:  2 days  Timing:  Intermittent  Chronicity:  New  Relieved by:  None tried  Worsened by:  Movement  Ineffective treatments:  None tried  Associated symptoms: nausea    Associated symptoms: no anorexia, no belching, no chest pain, no chills, no constipation, no cough, no diarrhea, no dysuria, no fatigue, no fever, no hematemesis, no hematochezia, no hematuria, no shortness of breath, no sore throat and no vomiting    Risk factors: alcohol abuse    Risk factors: has not had multiple surgeries    Risk factors comment:  Patient has history of meth use; reports last use was a few days ago      Review of Systems   Constitutional: Negative for appetite change, chills, fatigue and fever.   HENT: Negative for congestion and sore throat.    Respiratory: Negative for cough, chest tightness and shortness of breath.    Cardiovascular: Negative for chest pain and palpitations.   Gastrointestinal: Positive for abdominal pain and nausea. Negative for abdominal distention, anorexia, constipation, diarrhea, hematemesis, hematochezia and vomiting.   Genitourinary: Negative for difficulty urinating, dysuria and hematuria.   Musculoskeletal: Negative for back pain, joint swelling, neck pain and neck stiffness.   Skin: Negative for rash.   Neurological: Negative for dizziness and headaches.   All other systems reviewed and are negative.      Past Medical History:   Diagnosis Date   • CHF (congestive heart failure)        No Known Allergies    Past Surgical History:   Procedure Laterality Date   • HAND SURGERY         History reviewed. No pertinent family history.    Social History     Social History   • Marital status: Single     Social History Main Topics   • Smoking status: Current Every Day Smoker   • Alcohol use Yes      Comment: occasional   • Drug  "use: Yes     Special: Methamphetamines      Comment: Patient states, \"I smoke meth every other day.\"   • Sexual activity: Defer       Prior to Admission medications    Medication Sig Start Date End Date Taking? Authorizing Provider   aspirin 81 MG EC tablet Take 81 mg by mouth. 11/21/16  Yes Historical Provider, MD       Medications   sodium chloride 0.9 % bolus 500 mL (0 mL Intravenous Stopped 3/4/18 0042)   ondansetron (ZOFRAN) injection 4 mg (4 mg Intravenous Given 3/4/18 0018)   iopamidol (ISOVUE-300) 61 % injection 100 mL (100 mL Intravenous Given 3/4/18 0133)   sodium chloride 0.9 % bolus 1,000 mL (0 mL Intravenous Stopped 3/4/18 0302)   enoxaparin (LOVENOX) syringe 90 mg (90 mg Subcutaneous Given 3/4/18 0413)   furosemide (LASIX) injection 60 mg (60 mg Intravenous Given 3/4/18 0406)   cefTRIAXone (ROCEPHIN) 2 g/20mL IV PUSH syringe (2 g Intravenous Given 3/4/18 0414)   AZITHROMYCIN 500 MG/250 ML 0.9% NS IVPB (vial-mate) (0 mg Intravenous Stopped 3/4/18 0521)   LORazepam (ATIVAN) injection 1 mg (1 mg Intravenous Given 3/4/18 0420)   xenon xe 133 gas 10 mCi 16.3 millicurie (16.3 millicuries Inhalation Given 3/4/18 0530)   technetium albumin aggregated (MAA) solution 1 dose (1 dose Intravenous Given 3/4/18 0532)       /80  Pulse (!) 133  Temp 98.2 °F (36.8 °C) (Oral)   Resp 19  Ht 170.2 cm (67\")  Wt 86.2 kg (190 lb)  SpO2 99%  BMI 29.76 kg/m2      Objective   Physical Exam   Constitutional: He is oriented to person, place, and time. He appears well-developed and well-nourished.   HENT:   Head: Normocephalic and atraumatic.   Right Ear: External ear normal.   Left Ear: External ear normal.   Nose: Nose normal.   Mouth/Throat: Oropharynx is clear and moist.   Eyes: Conjunctivae and EOM are normal. Pupils are equal, round, and reactive to light.   Neck: Normal range of motion. Neck supple.   Cardiovascular: Normal rate, regular rhythm, normal heart sounds and intact distal pulses.    Pulmonary/Chest: " Effort normal and breath sounds normal.   Abdominal: Soft. Bowel sounds are normal. There is tenderness. There is no rebound and no guarding.   Pain to palpation to the epigastric region, periumbilical region, and right lower quadrant region; no rebound tenderness noted; no guarding or palpable masses noted; bowel sounds noted to all 4 quadrants   Musculoskeletal: Normal range of motion.   Neurological: He is alert and oriented to person, place, and time.   Skin: Skin is warm and dry.   Psychiatric: He has a normal mood and affect. His behavior is normal. Judgment and thought content normal.   Nursing note and vitals reviewed.      ECG 12 Lead    Date/Time: 3/4/2018 6:29 AM  Performed by: RENE JUDD  Authorized by: CHERYL ESPINOZA                  Lab Results (last 24 hours)     Procedure Component Value Units Date/Time    CBC & Differential [[id number]] Collected:  03/03/18 2359    Specimen:  Blood Updated:  03/04/18 0048    Narrative:       The following orders were created for panel order CBC & Differential.  Procedure                               Abnormality         Status                     ---------                               -----------         ------                     CBC Auto Differential[[id number]]         Abnormal            Final result                 Please view results for these tests on the individual orders.    Comprehensive Metabolic Panel [[id number]]  (Abnormal) Collected:  03/03/18 2359    Specimen:  Blood Updated:  03/04/18 0025     Glucose 128 (H) mg/dL      BUN 20 mg/dL      Creatinine 1.05 mg/dL      Sodium 141 mmol/L      Potassium 4.4 mmol/L      Chloride 105 mmol/L      CO2 23.0 (L) mmol/L      Calcium 8.5 mg/dL      Total Protein 6.2 (L) g/dL      Albumin 3.40 (L) g/dL      ALT (SGPT) 126 (H) U/L      AST (SGOT) 105 (H) U/L      Alkaline Phosphatase 96 U/L      Total Bilirubin 0.3 mg/dL      eGFR Non African Amer 82 mL/min/1.73      Globulin 2.8 gm/dL      A/G Ratio 1.2  g/dL      BUN/Creatinine Ratio 19.0     Anion Gap 13.0 mmol/L     Lipase [[id number]]  (Normal) Collected:  03/03/18 2359    Specimen:  Blood Updated:  03/04/18 0025     Lipase 63 U/L     Ethanol [[id number]]  (Normal) Collected:  03/03/18 2359    Specimen:  Blood Updated:  03/04/18 0025     Ethanol % <0.010 %     Narrative:       Not for legal purposes. Chain of Custody not followed.     CBC Auto Differential [[id number]]  (Abnormal) Collected:  03/03/18 2359    Specimen:  Blood Updated:  03/04/18 0048     WBC 10.24 10*3/mm3      RBC 3.97 (L) 10*6/mm3      Hemoglobin 12.2 (L) g/dL      Hematocrit 37.0 (L) %      MCV 93.2 fL      MCH 30.7 pg      MCHC 33.0 g/dL      RDW 15.9 (H) %      RDW-SD 53.0 fl      MPV 10.6 fL      Platelets 275 10*3/mm3      Neutrophil % 76.6 %      Lymphocyte % 15.0 %      Monocyte % 6.5 %      Eosinophil % 1.1 %      Basophil % 0.5 %      Immature Grans % 0.3 %      Neutrophils, Absolute 7.84 10*3/mm3      Lymphocytes, Absolute 1.54 10*3/mm3      Monocytes, Absolute 0.67 10*3/mm3      Eosinophils, Absolute 0.11 10*3/mm3      Basophils, Absolute 0.05 10*3/mm3      Immature Grans, Absolute 0.03 10*3/mm3      nRBC 0.2 (H) /100 WBC     Procalcitonin [[id number]]  (Abnormal) Collected:  03/03/18 2359    Specimen:  Blood Updated:  03/04/18 0439     Procalcitonin 0.55 (H) ng/mL     Narrative:       SIRS, sepsis, severe sepsis, and septic shock are categorized according to the criteria of the consensus conference of the American College of Chest Physicians/Society of Critical Care Medicine.    PCT < 0.5 ng/mL     Systemic infection (sepsis) is not likely.    PCT >0.5 and < 2.0 ng/mL Systemic infection (sepsis) is possible, but other conditions are known to elevate PCT as well.    PCT > 2.0 ng/mL     Systemic infection (sepsis) is likely, unless other causes are known.      PCT > 10.0 ng/mL    Important systemic inflammatory response, almost exclusively due to severe bacterial sepsis or septic  shock.    PCT values of < 0.5 ng/mL do not exclude an infection, because localized infections (without systemic signs) may be associated with such low concentrations, or a systemic infection in its initial stages (<6 hours).  Increased PCT can occur without infection.  PCT concentrations between 0.5 and 2.0 ng/mL should be interpreted taking into account the patients history.  It is recommended to retest PCT within 6-24 hours if any concentrations < 2.0 ng/mL are obtained.    Lactic Acid, Plasma [[id number]]  (Normal) Collected:  03/03/18 2359    Specimen:  Blood Updated:  03/04/18 0433     Lactate 1.7 mmol/L     BNP [[id number]]  (Abnormal) Collected:  03/03/18 2359    Specimen:  Blood Updated:  03/04/18 0526     proBNP 9930.0 (H) pg/mL     Troponin [[id number]]  (Normal) Collected:  03/03/18 2359    Specimen:  Blood Updated:  03/04/18 0526     Troponin I 0.032 ng/mL     CK-MB [[id number]]  (Normal) Collected:  03/03/18 2359    Specimen:  Blood Updated:  03/04/18 0526     CKMB 2.07 ng/mL     Narrative:       CKMB Index not indicated    C-reactive Protein [[id number]]  (Abnormal) Collected:  03/03/18 2359    Specimen:  Blood Updated:  03/04/18 0516     C-Reactive Protein 3.17 (H) mg/dL     Urine Drug Screen - Urine, Clean Catch [[id number]]  (Abnormal) Collected:  03/04/18 0110    Specimen:  Urine from Urine, Clean Catch Updated:  03/04/18 0235     Amphetamine Screen, Urine Positive (A)     Barbiturates Screen, Urine Negative     Benzodiazepine Screen, Urine Negative     Cocaine Screen, Urine Negative     Methadone Screen, Urine Negative     Opiate Screen Negative     Phencyclidine (PCP), Urine Negative     THC, Screen, Urine Negative    Narrative:       Negative Thresholds For Drugs Screened in Urine:    Amphetamines          500 ng/ml  Barbiturates          200 ng/ml  Benzodiazepines       200 ng/ml  Cocaine               150 ng/ml  Methadone             150 ng/ml  Opiates               300 ng/ml  Phencyclidine          "25 ng/ml  THC                      50 ng/ml    The normal value for all drugs tested is negative. This report includes final unconfirmed screening results.  A positive result by this assay can be, at your request, sent to the Reference Lab for confirmation by gas chromatography. Unconfirmed results must not be used for non-medical purposes, such as employment or legal testing. Clinical consideration should be applied to any drug of abuse test result, particularly when unconfirmed results are used.    Urinalysis With / Microscopic If Indicated - Urine, Clean Catch [[id number]]  (Abnormal) Collected:  03/04/18 0110    Specimen:  Urine from Urine, Clean Catch Updated:  03/04/18 0128     Color, UA Yellow     Appearance, UA Clear     pH, UA <=5.0     Specific Gravity, UA >=1.030     Glucose, UA Negative     Ketones, UA Negative     Bilirubin, UA Negative     Blood, UA Negative     Protein, UA 30 mg/dL (1+) (A)     Leuk Esterase, UA Negative     Nitrite, UA Negative     Urobilinogen, UA 1.0 E.U./dL    Urinalysis, Microscopic Only - Urine, Clean Catch [[id number]]  (Abnormal) Collected:  03/04/18 0110    Specimen:  Urine from Urine, Clean Catch Updated:  03/04/18 0128     RBC, UA 3-5 (A) /HPF      WBC, UA None Seen /HPF      Bacteria, UA None Seen /HPF      Squamous Epithelial Cells, UA None Seen /HPF      Hyaline Casts, UA None Seen /LPF      Methodology Automated Microscopy          CT Abdomen Pelvis With Contrast    (Results Pending)   NM Lung Ventilation Perfusion    (Results Pending)     Endorsed to me:     Patient has continued to have elevated HR despite fluids, his CT does show re-accumulation of fluids in his chest and abdomen. 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 At this time, it appears he has " placed himself back into heart failure with noted SOB and PND and KANG, Will admit .    As for his abdomen, he only notes pain upon coughing, his hernia is easily reducible.     ED Course This will be a turnover for Dr. Calabrese. See his note for details.   ED Course          MDM    Final diagnoses:   Acute on chronic congestive heart failure, unspecified congestive heart failure type   Tachycardia   Methamphetamine abuse   Pneumonia due to infectious organism, unspecified laterality, unspecified part of lung   Umbilical hernia without obstruction and without gangrene          Scout Calabrese MD  03/04/18 7138     Please take 2 tablets of Extra-Strength Tylenol (500mg) every 6 hours for 4-5 days, for pain control. Do NOT exceed 4,000 mg of Tylenol per 24 hours.

## 2022-03-01 NOTE — PACU DISCHARGE NOTE - COMMENTS
discharge instruction given to patient.awake a/o x4 . hemodynamically stable. dsg right breast intact dry weraing surgical bra. d/c to floor on a wheelchair

## 2022-03-01 NOTE — BRIEF OPERATIVE NOTE - NSICDXBRIEFPROCEDURE_GEN_ALL_CORE_FT
PROCEDURES:  Lumpectomy of right breast with sentinel node biopsy 01-Mar-2022 17:18:37  Stevan Lemus

## 2022-03-09 LAB — SURGICAL PATHOLOGY STUDY: SIGNIFICANT CHANGE UP

## 2022-03-14 ENCOUNTER — NON-APPOINTMENT (OUTPATIENT)
Age: 87
End: 2022-03-14

## 2022-03-14 ENCOUNTER — APPOINTMENT (OUTPATIENT)
Dept: BREAST CENTER | Facility: CLINIC | Age: 87
End: 2022-03-14
Payer: MEDICARE

## 2022-03-14 VITALS — HEART RATE: 77 BPM | OXYGEN SATURATION: 97 % | BODY MASS INDEX: 23.46 KG/M2 | HEIGHT: 66 IN | WEIGHT: 146 LBS

## 2022-03-14 PROBLEM — I10 ESSENTIAL (PRIMARY) HYPERTENSION: Chronic | Status: ACTIVE | Noted: 2022-02-28

## 2022-03-14 PROBLEM — C50.919 MALIGNANT NEOPLASM OF UNSPECIFIED SITE OF UNSPECIFIED FEMALE BREAST: Chronic | Status: ACTIVE | Noted: 2022-02-28

## 2022-03-14 PROBLEM — I48.91 UNSPECIFIED ATRIAL FIBRILLATION: Chronic | Status: ACTIVE | Noted: 2022-02-28

## 2022-03-14 PROBLEM — Z86.39 PERSONAL HISTORY OF OTHER ENDOCRINE, NUTRITIONAL AND METABOLIC DISEASE: Chronic | Status: ACTIVE | Noted: 2022-02-28

## 2022-03-14 PROCEDURE — 99024 POSTOP FOLLOW-UP VISIT: CPT

## 2022-03-16 NOTE — HISTORY OF PRESENT ILLNESS
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 Denies pain, fever, drainage or redness around the incision and is without complaint. \par \par The patient has a history of left breast DCIS, ER+, s/p lumpectomy & XRT in 2000. She is is s/p a 20 year course of Letrozole, completed in 2020. \par \par Patient understands that next steps are medical and radiation oncology consults.

## 2022-03-16 NOTE — DATA REVIEWED
[FreeTextEntry1] : --12/18/19 ([facility]) b/l breast mmg & US: heterogenously dense breasts. Benign mmg findings w/o interval change. Negative b/l US. BI-RADS 2. \par \par -- 1/13/21 ([facility]) b/l mmg & US: benign mmg w/o interval change; negative b/l US. BI-RADS 2. \par \par 1/18/22 ([facility]) heterogenously dense. New suspicious linear calcifications right breast. BI-RADS 4B.\par \par 1/26/22 ([facility]) Right breast  12:00, posterior, stereotactic core biopsy: Ductal carcinoma in situ, high nuclear grade, with foci highly suspicious for microinvasion, with central comedo-type necrosis and associated microcalcifications\par ADDENDUM: ESTROGEN RECEPTOR:  NEGATIVE  ( 0 STAINING)\par PROGESTERONE RECEPTOR: NEGATIVE  (0 STAINING)\par HER-2: NEGATIVE  \par ADDENDUM: Deeper levels and immunohistochemical stains were ordered on slide 1B to rule out microinvasion. However, on the deeper levels some of the ducts, which had previously appeared to have ductal carcinoma in situ, are now more irregularly shaped and invasive-appearing.  Immunohistochemistry for p63, SMMH and MSA confirms the absence of myoepithelial cells in these irregular ducts and thus they are invasive carcinoma. Other ducts remain as in situ carcinoma. Right breast, stereotactic core biopsy: INVASIVE DUCTAL CARCINOMA, HIGH GRADE, 4 MM ON THIS SLIDE\par \par 2/7/22 ([facility])b/l mmg & US:  heterogenously dense. 1.  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  Needle localization can be performed with mammography guidance. 2.  Unremarkable bilateral contrast mammography exam. 3.  No evidence of adenopathy in either axilla. BI-RADS 6. \par \par 2/9/22 ([facility]) genetic testing: negative. \par \par 3/1/22 ([facility]) right lumpectomy w/ SLN: 1. Breast, right; excision: - Invasive ductal carcinoma, poorly differentiated, 2 mm, see note and synoptic report - All margins over 5 mm - Biopsy site changes- Calcifications associated with wall of blood vessels and benign epithelium\par 2. Axilla, right, sentinel lymph node; biopsy: - One lymph node, negative for metastatic carcinoma (0/1)\par 3. Breast, new superior margin, excision: - Benign fibroadipose tissue\par 4. Breast, new inferior margin, excision: - Benign breast tissue - Calcifications associated with benign epithelium\par 5. Breast, new inferior margin #2; excision: - Benign breast tissue\par 6. Breast, right, new medial margin; excision: - Benign breast tissue\par 7. Breast, right, new lateral margin; excision: - Benign breast tissue\par 8. Breast, right, new anterior margin; excision: - Benign fibroadipose tissue

## 2022-03-22 ENCOUNTER — APPOINTMENT (OUTPATIENT)
Dept: HEMATOLOGY ONCOLOGY | Facility: CLINIC | Age: 87
End: 2022-03-22
Payer: MEDICARE

## 2022-03-29 ENCOUNTER — APPOINTMENT (OUTPATIENT)
Dept: RADIATION ONCOLOGY | Facility: CLINIC | Age: 87
End: 2022-03-29
Payer: MEDICARE

## 2022-03-29 ENCOUNTER — APPOINTMENT (OUTPATIENT)
Dept: HEMATOLOGY ONCOLOGY | Facility: CLINIC | Age: 87
End: 2022-03-29
Payer: MEDICARE

## 2022-03-29 VITALS
OXYGEN SATURATION: 99 % | SYSTOLIC BLOOD PRESSURE: 155 MMHG | RESPIRATION RATE: 16 BRPM | WEIGHT: 151 LBS | DIASTOLIC BLOOD PRESSURE: 90 MMHG | HEART RATE: 66 BPM | TEMPERATURE: 98.1 F | BODY MASS INDEX: 24.37 KG/M2

## 2022-03-29 VITALS
SYSTOLIC BLOOD PRESSURE: 136 MMHG | OXYGEN SATURATION: 98 % | BODY MASS INDEX: 23.95 KG/M2 | HEIGHT: 66 IN | DIASTOLIC BLOOD PRESSURE: 87 MMHG | TEMPERATURE: 96.1 F | WEIGHT: 149 LBS | HEART RATE: 69 BPM | RESPIRATION RATE: 18 BRPM

## 2022-03-29 DIAGNOSIS — C50.919 MALIGNANT NEOPLASM OF UNSPECIFIED SITE OF UNSPECIFIED FEMALE BREAST: ICD-10-CM

## 2022-03-29 PROCEDURE — 99204 OFFICE O/P NEW MOD 45 MIN: CPT | Mod: 25

## 2022-03-29 PROCEDURE — 99203 OFFICE O/P NEW LOW 30 MIN: CPT

## 2022-03-29 NOTE — HISTORY OF PRESENT ILLNESS
[de-identified] : 86F with newly diagnosed right breast cancer s/p right lumpectomy and SLNB on 3/1/22, is referred by Dr. Haile to discuss adjuvant systemic therapy.\par \par OncHx:\par Prior history of left microinvasive ductal carcinoma s/p 8/24/2000 left lumpectomy and left ALD (Audelia Hogan @ [facility]) + XRT in 2000 at [place]. She was initially on Tamoxifen for about 5 years because letrozole was not available, then was switched to Letrozole and was on until end of 2019/early 2020. \par \par 7/12/2000 left breast biopsy: DCIS multifocal with focal microinvasion, solid and comedo type, high grade, microcalcifications, ER-, [place]-, HER2+ \par \par 8/24/2000 left breast excisional biopsy and left axillary dissection: s/p excision of microinvasive ductal carcinoma. Scar, suture granulomata and biopsy cavity - no residual tumor seen.  8 axillary lymph nodes free of tumor.\par \par 12/18/19 ([facility]) b/l breast mmg & US: heterogenously dense breasts. Benign mmg findings w/o interval change. Negative b/l US.  \par \par 1/13/21 ([facility]) b/l mmg & US: benign mmg w/o interval change; negative b/l US. \par \par 1/18/22 ([facility]) scr mammo, rt dx mammo, US: heterogenously dense. New suspicious linear calcifications right breast. \par \par 1/26/22 ([facility]) Right breast 12:00, posterior, stereotactic core biopsy: Invasive ductal carcinoma, high grade, 4mm on this slide. Ductal carcinoma in situ, high nuclear grade, with foci highly suspicious for microinvasion, with central comedo-type necrosis and associated microcalcifications. ER-, [place]-, HER2-\par \par 2/7/22 ([facility])b/l mmg & US: heterogenously dense. 1. 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 Needle localization can be performed with mammography guidance. 2. Unremarkable bilateral contrast mammography exam. 3. No evidence of adenopathy in either axilla.\par \par [facility]/22 [facility] genetic testing: negative. \par \par 3/1/22 ([facility]) right lumpectomy w/ SLN: Invasive ductal carcinoma, poorly differentiated, 2 mm. Negative margins.  0/1 LNs. \par \par Patient denies any breast pain at the surgery site, healing well. Denies any headache, back pain, abdominal pain, n/v/d/c, fever, chills, night sweats, weight loss, loss of appetite, chest pain, sob. \par  [de-identified] : poorly differentiated invasive ductal carcinoma [de-identified] : ER-, [place]-, HER2- [de-identified] : Genetic testing negative

## 2022-03-29 NOTE — REVIEW OF SYSTEMS
[Joint Pain] : joint pain [Fever] : no fever [Chills] : no chills [Night Sweats] : no night sweats [Fatigue] : no fatigue [Recent Change In Weight] : ~T no recent weight change [Chest Pain] : no chest pain [Palpitations] : no palpitations [Lower Ext Edema] : no lower extremity edema [Shortness Of Breath] : no shortness of breath [Cough] : no cough [SOB on Exertion] : no shortness of breath during exertion [Abdominal Pain] : no abdominal pain [Vomiting] : no vomiting [Constipation] : no constipation [Diarrhea] : no diarrhea [Dysuria] : no dysuria [Dizziness] : no dizziness [Hot Flashes] : no hot flashes [FreeTextEntry9] : b/l knees and right shoulder

## 2022-03-29 NOTE — PHYSICAL EXAM
[] : no respiratory distress [Breast Palpation Mass] : no palpable masses [Breast Abnormal Lactation (Galactorrhea)] : no nipple discharge [No UE Edema] : there is no upper extremity edema [Normal] : the sclera and conjunctiva were normal, pupils were equal in size, round, reactive to light and extraocular movements were intact. [Exaggerated Use Of Accessory Muscles For Inspiration] : no accessory muscle use [de-identified] : right breast with well healed surgical scar with moderate induration noted; no palpable masses on either breast

## 2022-03-29 NOTE — HISTORY OF PRESENT ILLNESS
[FreeTextEntry1] : Ms. 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 On 3/1/22 she is s/p Right Lumpectomy per Dr. 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) + XRT in 2000 (Unable to obtain RT records from [facility]). Letrozole [id number]. \par \par --12/18/19 ([facility]) b/l breast mmg & US: heterogenously dense breasts. Benign mmg findings w/o interval change. Negative b/l US. BI-RADS 2. \par \par -- 1/13/21 ([facility]) b/l mmg & US: benign mmg w/o interval change; negative b/l US. BI-RADS 2. \par \par 1/18/22 ([facility]) heterogenously dense. New suspicious linear calcifications right breast. BI-RADS 4B.\par \par 1/26/22 ([facility]) Right breast 12:00, posterior, stereotactic core biopsy: Ductal carcinoma in situ, high nuclear grade, with foci highly suspicious for microinvasion, with central comedo-type necrosis and associated microcalcifications\par ADDENDUM: ESTROGEN RECEPTOR: NEGATIVE ( 0 STAINING)\par PROGESTERONE RECEPTOR: NEGATIVE (0 STAINING)\par HER-2: NEGATIVE \par ADDENDUM: Deeper levels and immunohistochemical stains were ordered on slide 1B to rule out microinvasion. However, on the deeper levels some of the ducts, which had previously appeared to have ductal carcinoma in situ, are now more irregularly shaped and invasive-appearing. Immunohistochemistry for p63, SMMH and MSA confirms the absence of myoepithelial cells in these irregular ducts and thus they are invasive carcinoma. Other ducts remain as in situ carcinoma. Right breast, stereotactic core biopsy: INVASIVE DUCTAL CARCINOMA, HIGH GRADE, 4 MM ON THIS SLIDE\par \par 2/7/22 ([facility])b/l mmg & US: heterogenously dense. 1. 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 Needle localization can be performed with mammography guidance. 2. Unremarkable bilateral contrast mammography exam. 3. No evidence of adenopathy in either axilla. BI-RADS 6. \par \par 2/9/22 ([facility]) genetic testing: negative. \par \par 3/1/22-  Right lumpectomy w/ SLN per Dr. Haile\par Final Diagnosis\par 1. Breast, right; excision:\par - Invasive ductal carcinoma, poorly differentiated, 2 mm, see note\par and synoptic report\par - All margins over 5 mm\par - Biopsy site changes\par - Calcifications associated with wall of blood vessels and benign\par epithelium\par \par 2. Axilla, right, sentinel lymph node; biopsy:\par - One lymph node, negative for metastatic carcinoma (0/1)\par \par 3. Breast, new superior margin, excision:\par - Benign fibroadipose tissue\par \par 4. Breast, new inferior margin, excision:\par - Benign breast tissue\par - Calcifications associated with benign epithelium\par \par 5. Breast, new inferior margin #2; excision:\par - Benign breast tissue\par \par 6. Breast, right, new medial margin; excision:\par - Benign breast tissue\par \par 7. Breast, right, new lateral margin; excision:\par - Benign breast tissue\par \par 8. Breast, right, new anterior margin; excision:\par - Benign fibroadipose tissue\par ___________________________________________\par \par Synoptic Summary\par \par INVASIVE CARCINOMA OF THE BREAST: Resection\par SPECIMEN\par Excision (less than total mastectomy)\par Procedure:\par Specimen Laterality:  Right\par TUMOR\par Histologic Type:  Invasive carcinoma of no special type (ductal)\par Glandular (Acinar) / Tubular Differentiation:   Score 3\par Nuclear Pleomorphism:  Score 3\par Mitotic Rate:  Score 3\par Overall Grade:  Grade 3 (scores of 8 or 9)\par Tumor Size: Greatest dimension of largest invasive focus (Millimeters)\par - 4 mm\par Ductal Carcinoma In Situ (DCIS):   Not identified\par Tumor Extent\par Tumor Extent:  Not applicable\par Treatment Effect in the Breast:   No known presurgical therapy\par Treatment Effect in the Lymph Nodes:   Not applicable\par MARGINS\par Margin Status for Invasive Carcinoma:   All margins negative for invasive\par carcinoma\par Distance from Invasive Carcinoma to Closest Margin: Greater than -     5\par mm\par Margin Status for DCIS:  Not applicable (no DCIS in specimen)\par REGIONAL LYMPH NODES\par Regional Lymph Node Status:  All regional lymph nodes negative for tumor\par Total Number of Lymph Nodes Examined (sentinel and non-sentinel):     1\par Number of [place] Nodes Examined:   1\par DISTANT METASTASIS\par Distant Site(s) Involved:  Not applicable\par PATHOLOGIC STAGE CLASSIFICATION (pTNM, AJCC 8th Edition)\par Reporting of pT, pN, and (when applicable) pM categories is based\par on information available to the pathologist at the time the report\par is issued. As per the AJCC (Chapter 1, 8th Ed.) it is the managing\par physician's responsibility to establish the final pathologic stage based\par upon all pertinent information, including but potentially not limited to\par this pathology report.\par TNM Descriptors: Not applicable\par pT Category:  pT1a\par Regional Lymph Nodes Modifier:   (sn): [place] node(s) evaluated.\par pN Category:  pN0\par pM Category:  Not applicable - pM cannot be determined from the\par submitted specimen(s)\par Breast Biomarker Testing Performed on Previous Biopsy:\par Estrogen Receptor (ER)\par Estrogen Receptor (ER) Status:   Negative\par Progesterone Receptor (PgR)\par Progesterone Receptor (PgR) Status:   Negative\par HER2 (by immunohistochemistry)\par HER2 (by immunohistochemistry):   Negative (Score 0)\par Testing Performed on Case Number:   [id number]\par \par \par Today she presents for a consult regarding radiation therapy, referred by Dr. Haile. She had radiation delivered to the left breast approximately 20 years ago, and, after contacting that facility, they indicated these records are no longer available.  Her previous breast surgeon has passed away and there are no past radiation records available for review.  She is healing well from surgery.   The Right Breast/Axilla area skin is clean, dry and intact and incision sites are well approximated and well healed, one clear suture noted on chest incision, pt instructed per surgery team that it will fall out with time. Pt denies any breast pain or pain otherwise and denies nipple discharge. No upper extremity edema noted. Pt educated on the importance of  Aquaphor usage to the treated breast area during radiation therapy 2-3 times per day. Pt plans for consult with Dr. Peña on 3/29/22. \par

## 2022-03-29 NOTE — PHYSICAL EXAM
[Fully active, able to carry on all pre-disease performance without restriction] : Status 0 - Fully active, able to carry on all pre-disease performance without restriction [Normal] : affect appropriate [de-identified] : Anicterix , No conjunctival infection  [de-identified] : No calf tenderness, chronic venous stasis changes in LLE, varicose veins in LLE  [de-identified] : b/l breast without nipple retraction, skin dimpling or palpable masses. R-breast lumpectomy site, healing well. No axillary lymphadenopathy b/l.  [de-identified] : Chronic venous stasis changes in LLE

## 2022-04-27 ENCOUNTER — NON-APPOINTMENT (OUTPATIENT)
Age: 87
End: 2022-04-27

## 2022-05-04 ENCOUNTER — NON-APPOINTMENT (OUTPATIENT)
Age: 87
End: 2022-05-04

## 2022-05-04 NOTE — HISTORY OF PRESENT ILLNESS
[FreeTextEntry1] : 05/04/2022 - OTV- Today she completed 2650cGy/5240cGy to the Right Breast.  She presents for OTV today and states she overall feels well, denies fatigue. The RIGHT breast/axilla area shows no appreciable symptomatology related to her adjuvant radiation therapy; without appreciable hyperpigmentation, erythema, and desquamation. Right breast/axilla incision sites are clean, dry, intact and well approximated. Pt denies any breast pain or pain otherwise and denies nipple discharge. No Right upper extremity edema noted. Pt continues to apply Aquaphor to the treated breast area. \par \par 04/27/2022 - OTV- Today she completed 1325cGy/5240cGy to the Right Breast.  She presents for OTV today and states she overall feels well, denies fatigue. The RIGHT breast/axilla area shows no appreciable symptomatology related to her adjuvant radiation therapy; without appreciable hyperpigmentation, erythema, and desquamation. Right breast/axilla incision sites are clean, dry, intact and well approximated. Pt denies any breast pain or pain otherwise and denies nipple discharge. No Right upper extremity edema noted. Pt continues to apply Aquaphor to the treated breast area. \par \par Ms. 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 On 3/1/22 she is s/p Right Lumpectomy per Dr. 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) + XRT in 2000 (Unable to obtain RT records from [facility]). Letrozole [id number]. \par \par --12/18/19 ([facility]) b/l breast mmg & US: heterogenously dense breasts. Benign mmg findings w/o interval change. Negative b/l US. BI-RADS 2. \par \par -- 1/13/21 ([facility]) b/l mmg & US: benign mmg w/o interval change; negative b/l US. BI-RADS 2. \par \par 1/18/22 ([facility]) heterogenously dense. New suspicious linear calcifications right breast. BI-RADS 4B.\par \par 1/26/22 ([facility]) Right breast 12:00, posterior, stereotactic core biopsy: Ductal carcinoma in situ, high nuclear grade, with foci highly suspicious for microinvasion, with central comedo-type necrosis and associated microcalcifications\par ADDENDUM: ESTROGEN RECEPTOR: NEGATIVE ( 0 STAINING)\par PROGESTERONE RECEPTOR: NEGATIVE (0 STAINING)\par HER-2: NEGATIVE \par ADDENDUM: Deeper levels and immunohistochemical stains were ordered on slide 1B to rule out microinvasion. However, on the deeper levels some of the ducts, which had previously appeared to have ductal carcinoma in situ, are now more irregularly shaped and invasive-appearing. Immunohistochemistry for p63, SMMH and MSA confirms the absence of myoepithelial cells in these irregular ducts and thus they are invasive carcinoma. Other ducts remain as in situ carcinoma. Right breast, stereotactic core biopsy: INVASIVE DUCTAL CARCINOMA, HIGH GRADE, 4 MM ON THIS SLIDE\par \par 2/7/22 ([facility])b/l mmg & US: heterogenously dense. 1. 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 Needle localization can be performed with mammography guidance. 2. Unremarkable bilateral contrast mammography exam. 3. No evidence of adenopathy in either axilla. BI-RADS 6. \par \par 2/9/22 ([facility]) genetic testing: negative. \par \par 3/1/22-  Right lumpectomy w/ SLN per Dr. Haile\par Final Diagnosis\par 1. Breast, right; excision:\par - Invasive ductal carcinoma, poorly differentiated, 2 mm, see note\par and synoptic report\par - All margins over 5 mm\par - Biopsy site changes\par - Calcifications associated with wall of blood vessels and benign\par epithelium\par \par 2. Axilla, right, sentinel lymph node; biopsy:\par - One lymph node, negative for metastatic carcinoma (0/1)\par \par 3. Breast, new superior margin, excision:\par - Benign fibroadipose tissue\par \par 4. Breast, new inferior margin, excision:\par - Benign breast tissue\par - Calcifications associated with benign epithelium\par \par 5. Breast, new inferior margin #2; excision:\par - Benign breast tissue\par \par 6. Breast, right, new medial margin; excision:\par - Benign breast tissue\par \par 7. Breast, right, new lateral margin; excision:\par - Benign breast tissue\par \par 8. Breast, right, new anterior margin; excision:\par - Benign fibroadipose tissue\par ___________________________________________\par \par Synoptic Summary\par \par INVASIVE CARCINOMA OF THE BREAST: Resection\par SPECIMEN\par Excision (less than total mastectomy)\par Procedure:\par Specimen Laterality:  Right\par TUMOR\par Histologic Type:  Invasive carcinoma of no special type (ductal)\par Glandular (Acinar) / Tubular Differentiation:   Score 3\par Nuclear Pleomorphism:  Score 3\par Mitotic Rate:  Score 3\par Overall Grade:  Grade 3 (scores of 8 or 9)\par Tumor Size: Greatest dimension of largest invasive focus (Millimeters)\par - 4 mm\par Ductal Carcinoma In Situ (DCIS):   Not identified\par Tumor Extent\par Tumor Extent:  Not applicable\par Treatment Effect in the Breast:   No known presurgical therapy\par Treatment Effect in the Lymph Nodes:   Not applicable\par MARGINS\par Margin Status for Invasive Carcinoma:   All margins negative for invasive\par carcinoma\par Distance from Invasive Carcinoma to Closest Margin: Greater than -     5\par mm\par Margin Status for DCIS:  Not applicable (no DCIS in specimen)\par REGIONAL LYMPH NODES\par Regional Lymph Node Status:  All regional lymph nodes negative for tumor\par Total Number of Lymph Nodes Examined (sentinel and non-sentinel):     1\par Number of [place] Nodes Examined:   1\par DISTANT METASTASIS\par Distant Site(s) Involved:  Not applicable\par PATHOLOGIC STAGE CLASSIFICATION (pTNM, AJCC 8th Edition)\par Reporting of pT, pN, and (when applicable) pM categories is based\par on information available to the pathologist at the time the report\par is issued. As per the AJCC (Chapter 1, 8th Ed.) it is the managing\par physician's responsibility to establish the final pathologic stage based\par upon all pertinent information, including but potentially not limited to\par this pathology report.\par TNM Descriptors: Not applicable\par pT Category:  pT1a\par Regional Lymph Nodes Modifier:   (sn): [place] node(s) evaluated.\par pN Category:  pN0\par pM Category:  Not applicable - pM cannot be determined from the\par submitted specimen(s)\par Breast Biomarker Testing Performed on Previous Biopsy:\par Estrogen Receptor (ER)\par Estrogen Receptor (ER) Status:   Negative\par Progesterone Receptor (PgR)\par Progesterone Receptor (PgR) Status:   Negative\par HER2 (by immunohistochemistry)\par HER2 (by immunohistochemistry):   Negative (Score 0)\par Testing Performed on Case Number:   [id number]\par \par \par Today she presents for a consult regarding radiation therapy, referred by Dr. Haile. She had radiation delivered to the left breast approximately 20 years ago, and, after contacting that facility, they indicated these records are no longer available.  Her previous breast surgeon has passed away and there are no past radiation records available for review.  She is healing well from surgery.   The Right Breast/Axilla area skin is clean, dry and intact and incision sites are well approximated and well healed, one clear suture noted on chest incision, pt instructed per surgery team that it will fall out with time. Pt denies any breast pain or pain otherwise and denies nipple discharge. No upper extremity edema noted. Pt educated on the importance of  Aquaphor usage to the treated breast area during radiation therapy 2-3 times per day. Pt plans for consult with Dr. Peña on 3/29/22. \par

## 2022-05-04 NOTE — DISEASE MANAGEMENT
[Pathological] : TNM Stage: p [IA] : IA [TTNM] : 1a [NTNM] : 0 [MTNM] : x [de-identified] : 1422 [de-identified] : [id number] [de-identified] : Right Breast

## 2022-05-04 NOTE — PHYSICAL EXAM
[Normal] : oriented to person, place and time, the affect was normal, the mood was normal and not anxious [de-identified] : Modest erythema on the right breast (grade 1)

## 2022-05-09 NOTE — REASON FOR VISIT
[Routine On-Treatment] : a routine on-treatment visit for [Breast Cancer] : breast cancer no vomiting/no nausea

## 2022-05-11 ENCOUNTER — NON-APPOINTMENT (OUTPATIENT)
Age: 87
End: 2022-05-11

## 2022-05-15 NOTE — HISTORY OF PRESENT ILLNESS
[FreeTextEntry1] : 04/27/2022 - OTV- Today she completed 1325cGy/5240cGy to the Right Breast.  She presents for OTV today and states she overall feels well, denies fatigue. The RIGHT breast/axilla area shows no appreciable symptomatology related to her adjuvant radiation therapy; without appreciable hyperpigmentation, erythema, and desquamation. Right breast/axilla incision sites are clean, dry, intact and well approximated. Pt denies any breast pain or pain otherwise and denies nipple discharge. No Right upper extremity edema noted. Pt continues to apply Aquaphor to the treated breast area. \par \par Ms. 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 On 3/1/22 she is s/p Right Lumpectomy per Dr. 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 Letrozole [id number]. \par \par --12/18/19 ([facility]) b/l breast mmg & US: heterogenously dense breasts. Benign mmg findings w/o interval change. Negative b/l US. BI-RADS 2. \par \par -- 1/13/21 ([facility]) b/l mmg & US: benign mmg w/o interval change; negative b/l US. BI-RADS 2. \par \par 1/18/22 ([facility]) heterogenously dense. New suspicious linear calcifications right breast. BI-RADS 4B.\par \par 1/26/22 ([facility]) Right breast 12:00, posterior, stereotactic core biopsy: Ductal carcinoma in situ, high nuclear grade, with foci highly suspicious for microinvasion, with central comedo-type necrosis and associated microcalcifications\par ADDENDUM: ESTROGEN RECEPTOR: NEGATIVE ( 0 STAINING)\par PROGESTERONE RECEPTOR: NEGATIVE (0 STAINING)\par HER-2: NEGATIVE \par ADDENDUM: Deeper levels and immunohistochemical stains were ordered on slide 1B to rule out microinvasion. However, on the deeper levels some of the ducts, which had previously appeared to have ductal carcinoma in situ, are now more irregularly shaped and invasive-appearing. Immunohistochemistry for p63, SMMH and MSA confirms the absence of myoepithelial cells in these irregular ducts and thus they are invasive carcinoma. Other ducts remain as in situ carcinoma. Right breast, stereotactic core biopsy: INVASIVE DUCTAL CARCINOMA, HIGH GRADE, 4 MM ON THIS SLIDE\par \par 2/7/22 ([facility])b/l mmg & US: heterogenously dense. 1. 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 Needle localization can be performed with mammography guidance. 2. Unremarkable bilateral contrast mammography exam. 3. No evidence of adenopathy in either axilla. BI-RADS 6. \par \par 2/9/22 ([facility]) genetic testing: negative. \par \par 3/1/22-  Right lumpectomy w/ SLN per Dr. Haile\par Final Diagnosis\par 1. Breast, right; excision:\par - Invasive ductal carcinoma, poorly differentiated, 2 mm, see note\par and synoptic report\par - All margins over 5 mm\par - Biopsy site changes\par - Calcifications associated with wall of blood vessels and benign\par epithelium\par \par 2. Axilla, right, sentinel lymph node; biopsy:\par - One lymph node, negative for metastatic carcinoma (0/1)\par \par 3. Breast, new superior margin, excision:\par - Benign fibroadipose tissue\par \par 4. Breast, new inferior margin, excision:\par - Benign breast tissue\par - Calcifications associated with benign epithelium\par \par 5. Breast, new inferior margin #2; excision:\par - Benign breast tissue\par \par 6. Breast, right, new medial margin; excision:\par - Benign breast tissue\par \par 7. Breast, right, new lateral margin; excision:\par - Benign breast tissue\par \par 8. Breast, right, new anterior margin; excision:\par - Benign fibroadipose tissue\par ___________________________________________\par \par Synoptic Summary\par \par INVASIVE CARCINOMA OF THE BREAST: Resection\par SPECIMEN\par Excision (less than total mastectomy)\par Procedure:\par Specimen Laterality:  Right\par TUMOR\par Histologic Type:  Invasive carcinoma of no special type (ductal)\par Glandular (Acinar) / Tubular Differentiation:   Score 3\par Nuclear Pleomorphism:  Score 3\par Mitotic Rate:  Score 3\par Overall Grade:  Grade 3 (scores of 8 or 9)\par Tumor Size: Greatest dimension of largest invasive focus (Millimeters)\par - 4 mm\par Ductal Carcinoma In Situ (DCIS):   Not identified\par Tumor Extent\par Tumor Extent:  Not applicable\par Treatment Effect in the Breast:   No known presurgical therapy\par Treatment Effect in the Lymph Nodes:   Not applicable\par MARGINS\par Margin Status for Invasive Carcinoma:   All margins negative for invasive\par carcinoma\par Distance from Invasive Carcinoma to Closest Margin: Greater than -     5\par mm\par Margin Status for DCIS:  Not applicable (no DCIS in specimen)\par REGIONAL LYMPH NODES\par Regional Lymph Node Status:  All regional lymph nodes negative for tumor\par Total Number of Lymph Nodes Examined (sentinel and non-sentinel):     1\par Number of [place] Nodes Examined:   1\par DISTANT METASTASIS\par Distant Site(s) Involved:  Not applicable\par PATHOLOGIC STAGE CLASSIFICATION (pTNM, AJCC 8th Edition)\par Reporting of pT, pN, and (when applicable) pM categories is based\par on information available to the pathologist at the time the report\par is issued. As per the AJCC (Chapter 1, 8th Ed.) it is the managing\par physician's responsibility to establish the final pathologic stage based\par upon all pertinent information, including but potentially not limited to\par this pathology report.\par TNM Descriptors: Not applicable\par pT Category:  pT1a\par Regional Lymph Nodes Modifier:   (sn): [place] node(s) evaluated.\par pN Category:  pN0\par pM Category:  Not applicable - pM cannot be determined from the\par submitted specimen(s)\par Breast Biomarker Testing Performed on Previous Biopsy:\par Estrogen Receptor (ER)\par Estrogen Receptor (ER) Status:   Negative\par Progesterone Receptor (PgR)\par Progesterone Receptor (PgR) Status:   Negative\par HER2 (by immunohistochemistry)\par HER2 (by immunohistochemistry):   Negative (Score 0)\par Testing Performed on Case Number:   [id number]\par \par \par Today she presents for a consult regarding radiation therapy, referred by Dr. Haile. She had radiation delivered to the left breast approximately 20 years ago, and, after contacting that facility, they indicated these records are no longer available.  Her previous breast surgeon has passed away and there are no past radiation records available for review.  She is healing well from surgery.   The Right Breast/Axilla area skin is clean, dry and intact and incision sites are well approximated and well healed, one clear suture noted on chest incision, pt instructed per surgery team that it will fall out with time. Pt denies any breast pain or pain otherwise and denies nipple discharge. No upper extremity edema noted. Pt educated on the importance of  Aquaphor usage to the treated breast area during radiation therapy 2-3 times per day. Pt plans for consult with Dr. Peña on 3/29/22. \par

## 2022-05-15 NOTE — DISEASE MANAGEMENT
[Pathological] : TNM Stage: p [IA] : IA [TTNM] : 1a [NTNM] : 0 [MTNM] : x [de-identified] : 0212 [de-identified] : [id number] [de-identified] : Right Breast

## 2022-05-15 NOTE — DISEASE MANAGEMENT
[Pathological] : TNM Stage: p [IA] : IA [TTNM] : 1a [NTNM] : 0 [MTNM] : x [de-identified] : 6575 [de-identified] : [id number] [de-identified] : Right Breast

## 2022-05-15 NOTE — HISTORY OF PRESENT ILLNESS
[FreeTextEntry1] : 05/11/2022 - OTV- Today she completed 3710cGy/5240cGy to the Right Breast.  She presents for OTV today and states she overall feels well, denies fatigue. The RIGHT breast/axilla area shows trace erythema, without hyperpigmentation and desquamation. Right breast/axilla incision sites are clean, dry, intact and well approximated. Pt denies any breast pain or pain otherwise and denies nipple discharge. No Right upper extremity edema noted. Pt continues to apply Aquaphor to the treated breast area. \par \par \par 05/04/2022 - OTV- Today she completed 2650cGy/5240cGy to the Right Breast.  She presents for OTV today and states she overall feels well, denies fatigue. The RIGHT breast/axilla area shows no appreciable symptomatology related to her adjuvant radiation therapy; without appreciable hyperpigmentation, erythema, and desquamation. Right breast/axilla incision sites are clean, dry, intact and well approximated. Pt denies any breast pain or pain otherwise and denies nipple discharge. No Right upper extremity edema noted. Pt continues to apply Aquaphor to the treated breast area. \par \par 04/27/2022 - OTV- Today she completed 1325cGy/5240cGy to the Right Breast.  She presents for OTV today and states she overall feels well, denies fatigue. The RIGHT breast/axilla area shows no appreciable symptomatology related to her adjuvant radiation therapy; without appreciable hyperpigmentation, erythema, and desquamation. Right breast/axilla incision sites are clean, dry, intact and well approximated. Pt denies any breast pain or pain otherwise and denies nipple discharge. No Right upper extremity edema noted. Pt continues to apply Aquaphor to the treated breast area. \par \par Ms. 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 On 3/1/22 she is s/p Right Lumpectomy per Dr. 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 Letrozole [id number]. \par \par --12/18/19 ([facility]) b/l breast mmg & US: heterogenously dense breasts. Benign mmg findings w/o interval change. Negative b/l US. BI-RADS 2. \par \par -- 1/13/21 ([facility]) b/l mmg & US: benign mmg w/o interval change; negative b/l US. BI-RADS 2. \par \par 1/18/22 ([facility]) heterogenously dense. New suspicious linear calcifications right breast. BI-RADS 4B.\par \par 1/26/22 ([facility]) Right breast 12:00, posterior, stereotactic core biopsy: Ductal carcinoma in situ, high nuclear grade, with foci highly suspicious for microinvasion, with central comedo-type necrosis and associated microcalcifications\par ADDENDUM: ESTROGEN RECEPTOR: NEGATIVE ( 0 STAINING)\par PROGESTERONE RECEPTOR: NEGATIVE (0 STAINING)\par HER-2: NEGATIVE \par ADDENDUM: Deeper levels and immunohistochemical stains were ordered on slide 1B to rule out microinvasion. However, on the deeper levels some of the ducts, which had previously appeared to have ductal carcinoma in situ, are now more irregularly shaped and invasive-appearing. Immunohistochemistry for p63, SMMH and MSA confirms the absence of myoepithelial cells in these irregular ducts and thus they are invasive carcinoma. Other ducts remain as in situ carcinoma. Right breast, stereotactic core biopsy: INVASIVE DUCTAL CARCINOMA, HIGH GRADE, 4 MM ON THIS SLIDE\par \par 2/7/22 ([facility])b/l mmg & US: heterogenously dense. 1. 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 Needle localization can be performed with mammography guidance. 2. Unremarkable bilateral contrast mammography exam. 3. No evidence of adenopathy in either axilla. BI-RADS 6. \par \par 2/9/22 ([facility]) genetic testing: negative. \par \par 3/1/22-  Right lumpectomy w/ SLN per Dr. Haile\par Final Diagnosis\par 1. Breast, right; excision:\par - Invasive ductal carcinoma, poorly differentiated, 2 mm, see note\par and synoptic report\par - All margins over 5 mm\par - Biopsy site changes\par - Calcifications associated with wall of blood vessels and benign\par epithelium\par \par 2. Axilla, right, sentinel lymph node; biopsy:\par - One lymph node, negative for metastatic carcinoma (0/1)\par \par 3. Breast, new superior margin, excision:\par - Benign fibroadipose tissue\par \par 4. Breast, new inferior margin, excision:\par - Benign breast tissue\par - Calcifications associated with benign epithelium\par \par 5. Breast, new inferior margin #2; excision:\par - Benign breast tissue\par \par 6. Breast, right, new medial margin; excision:\par - Benign breast tissue\par \par 7. Breast, right, new lateral margin; excision:\par - Benign breast tissue\par \par 8. Breast, right, new anterior margin; excision:\par - Benign fibroadipose tissue\par ___________________________________________\par \par Synoptic Summary\par \par INVASIVE CARCINOMA OF THE BREAST: Resection\par SPECIMEN\par Excision (less than total mastectomy)\par Procedure:\par Specimen Laterality:  Right\par TUMOR\par Histologic Type:  Invasive carcinoma of no special type (ductal)\par Glandular (Acinar) / Tubular Differentiation:   Score 3\par Nuclear Pleomorphism:  Score 3\par Mitotic Rate:  Score 3\par Overall Grade:  Grade 3 (scores of 8 or 9)\par Tumor Size: Greatest dimension of largest invasive focus (Millimeters)\par - 4 mm\par Ductal Carcinoma In Situ (DCIS):   Not identified\par Tumor Extent\par Tumor Extent:  Not applicable\par Treatment Effect in the Breast:   No known presurgical therapy\par Treatment Effect in the Lymph Nodes:   Not applicable\par MARGINS\par Margin Status for Invasive Carcinoma:   All margins negative for invasive\par carcinoma\par Distance from Invasive Carcinoma to Closest Margin: Greater than -     5\par mm\par Margin Status for DCIS:  Not applicable (no DCIS in specimen)\par REGIONAL LYMPH NODES\par Regional Lymph Node Status:  All regional lymph nodes negative for tumor\par Total Number of Lymph Nodes Examined (sentinel and non-sentinel):     1\par Number of [place] Nodes Examined:   1\par DISTANT METASTASIS\par Distant Site(s) Involved:  Not applicable\par PATHOLOGIC STAGE CLASSIFICATION (pTNM, AJCC 8th Edition)\par Reporting of pT, pN, and (when applicable) pM categories is based\par on information available to the pathologist at the time the report\par is issued. As per the AJCC (Chapter 1, 8th Ed.) it is the managing\par physician's responsibility to establish the final pathologic stage based\par upon all pertinent information, including but potentially not limited to\par this pathology report.\par TNM Descriptors: Not applicable\par pT Category:  pT1a\par Regional Lymph Nodes Modifier:   (sn): [place] node(s) evaluated.\par pN Category:  pN0\par pM Category:  Not applicable - pM cannot be determined from the\par submitted specimen(s)\par Breast Biomarker Testing Performed on Previous Biopsy:\par Estrogen Receptor (ER)\par Estrogen Receptor (ER) Status:   Negative\par Progesterone Receptor (PgR)\par Progesterone Receptor (PgR) Status:   Negative\par HER2 (by immunohistochemistry)\par HER2 (by immunohistochemistry):   Negative (Score 0)\par Testing Performed on Case Number:   [id number]\par \par \par Today she presents for a consult regarding radiation therapy, referred by Dr. Haile. She had radiation delivered to the left breast approximately 20 years ago, and, after contacting that facility, they indicated these records are no longer available.  Her previous breast surgeon has passed away and there are no past radiation records available for review.  She is healing well from surgery.   The Right Breast/Axilla area skin is clean, dry and intact and incision sites are well approximated and well healed, one clear suture noted on chest incision, pt instructed per surgery team that it will fall out with time. Pt denies any breast pain or pain otherwise and denies nipple discharge. No upper extremity edema noted. Pt educated on the importance of  Aquaphor usage to the treated breast area during radiation therapy 2-3 times per day. Pt plans for consult with Dr. Peña on 3/29/22. \par

## 2022-05-18 ENCOUNTER — NON-APPOINTMENT (OUTPATIENT)
Age: 87
End: 2022-05-18

## 2022-05-22 NOTE — DISEASE MANAGEMENT
[Pathological] : TNM Stage: p [IA] : IA [TTNM] : 1a [NTNM] : 0 [MTNM] : x [de-identified] : 7922 [de-identified] : 6005 [de-identified] : Right Breast 160.02

## 2022-05-22 NOTE — HISTORY OF PRESENT ILLNESS
[FreeTextEntry1] : 05/18/2022 - OTV- Today she completed 5240cGy/5240cGy to the Right Breast.  She presents for OTV today and states she overall feels well, denies fatigue. The RIGHT breast/axilla area shows trace erythema, without hyperpigmentation and desquamation. Right breast/axilla incision sites are clean, dry, intact and well approximated. Pt denies any breast pain or pain otherwise and denies nipple discharge. No Right upper extremity edema noted. Pt continues to apply Aquaphor to the treated breast area. Will follow up with patient at PTE appointment.\par \par \par 05/11/2022 - OTV- Today she completed 3710cGy/5240cGy to the Right Breast.  She presents for OTV today and states she overall feels well, denies fatigue. The RIGHT breast/axilla area shows trace erythema, without hyperpigmentation and desquamation. Right breast/axilla incision sites are clean, dry, intact and well approximated. Pt denies any breast pain or pain otherwise and denies nipple discharge. No Right upper extremity edema noted. Pt continues to apply Aquaphor to the treated breast area. \par \par \par 05/04/2022 - OTV- Today she completed 2650cGy/5240cGy to the Right Breast.  She presents for OTV today and states she overall feels well, denies fatigue. The RIGHT breast/axilla area shows no appreciable symptomatology related to her adjuvant radiation therapy; without appreciable hyperpigmentation, erythema, and desquamation. Right breast/axilla incision sites are clean, dry, intact and well approximated. Pt denies any breast pain or pain otherwise and denies nipple discharge. No Right upper extremity edema noted. Pt continues to apply Aquaphor to the treated breast area. \par \par 04/27/2022 - OTV- Today she completed 1325cGy/5240cGy to the Right Breast.  She presents for OTV today and states she overall feels well, denies fatigue. The RIGHT breast/axilla area shows no appreciable symptomatology related to her adjuvant radiation therapy; without appreciable hyperpigmentation, erythema, and desquamation. Right breast/axilla incision sites are clean, dry, intact and well approximated. Pt denies any breast pain or pain otherwise and denies nipple discharge. No Right upper extremity edema noted. Pt continues to apply Aquaphor to the treated breast area. \par \par Ms. 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 On 3/1/22 she is s/p Right Lumpectomy per Dr. 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 Letrozole [id number]. \par \par --12/18/19 ([facility]) b/l breast mmg & US: heterogenously dense breasts. Benign mmg findings w/o interval change. Negative b/l US. BI-RADS 2. \par \par -- 1/13/21 ([facility]) b/l mmg & US: benign mmg w/o interval change; negative b/l US. BI-RADS 2. \par \par 1/18/22 ([facility]) heterogenously dense. New suspicious linear calcifications right breast. BI-RADS 4B.\par \par 1/26/22 ([facility]) Right breast 12:00, posterior, stereotactic core biopsy: Ductal carcinoma in situ, high nuclear grade, with foci highly suspicious for microinvasion, with central comedo-type necrosis and associated microcalcifications\par ADDENDUM: ESTROGEN RECEPTOR: NEGATIVE ( 0 STAINING)\par PROGESTERONE RECEPTOR: NEGATIVE (0 STAINING)\par HER-2: NEGATIVE \par ADDENDUM: Deeper levels and immunohistochemical stains were ordered on slide 1B to rule out microinvasion. However, on the deeper levels some of the ducts, which had previously appeared to have ductal carcinoma in situ, are now more irregularly shaped and invasive-appearing. Immunohistochemistry for p63, SMMH and MSA confirms the absence of myoepithelial cells in these irregular ducts and thus they are invasive carcinoma. Other ducts remain as in situ carcinoma. Right breast, stereotactic core biopsy: INVASIVE DUCTAL CARCINOMA, HIGH GRADE, 4 MM ON THIS SLIDE\par \par 2/7/22 ([facility])b/l mmg & US: heterogenously dense. 1. 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 Needle localization can be performed with mammography guidance. 2. Unremarkable bilateral contrast mammography exam. 3. No evidence of adenopathy in either axilla. BI-RADS 6. \par \par 2/9/22 ([facility]) genetic testing: negative. \par \par 3/1/22-  Right lumpectomy w/ SLN per Dr. Haile\par Final Diagnosis\par 1. Breast, right; excision:\par - Invasive ductal carcinoma, poorly differentiated, 2 mm, see note\par and synoptic report\par - All margins over 5 mm\par - Biopsy site changes\par - Calcifications associated with wall of blood vessels and benign\par epithelium\par \par 2. Axilla, right, sentinel lymph node; biopsy:\par - One lymph node, negative for metastatic carcinoma (0/1)\par \par 3. Breast, new superior margin, excision:\par - Benign fibroadipose tissue\par \par 4. Breast, new inferior margin, excision:\par - Benign breast tissue\par - Calcifications associated with benign epithelium\par \par 5. Breast, new inferior margin #2; excision:\par - Benign breast tissue\par \par 6. Breast, right, new medial margin; excision:\par - Benign breast tissue\par \par 7. Breast, right, new lateral margin; excision:\par - Benign breast tissue\par \par 8. Breast, right, new anterior margin; excision:\par - Benign fibroadipose tissue\par ___________________________________________\par \par Synoptic Summary\par \par INVASIVE CARCINOMA OF THE BREAST: Resection\par SPECIMEN\par Excision (less than total mastectomy)\par Procedure:\par Specimen Laterality:  Right\par TUMOR\par Histologic Type:  Invasive carcinoma of no special type (ductal)\par Glandular (Acinar) / Tubular Differentiation:   Score 3\par Nuclear Pleomorphism:  Score 3\par Mitotic Rate:  Score 3\par Overall Grade:  Grade 3 (scores of 8 or 9)\par Tumor Size: Greatest dimension of largest invasive focus (Millimeters)\par - 4 mm\par Ductal Carcinoma In Situ (DCIS):   Not identified\par Tumor Extent\par Tumor Extent:  Not applicable\par Treatment Effect in the Breast:   No known presurgical therapy\par Treatment Effect in the Lymph Nodes:   Not applicable\par MARGINS\par Margin Status for Invasive Carcinoma:   All margins negative for invasive\par carcinoma\par Distance from Invasive Carcinoma to Closest Margin: Greater than -     5\par mm\par Margin Status for DCIS:  Not applicable (no DCIS in specimen)\par REGIONAL LYMPH NODES\par Regional Lymph Node Status:  All regional lymph nodes negative for tumor\par Total Number of Lymph Nodes Examined (sentinel and non-sentinel):     1\par Number of [place] Nodes Examined:   1\par DISTANT METASTASIS\par Distant Site(s) Involved:  Not applicable\par PATHOLOGIC STAGE CLASSIFICATION (pTNM, AJCC 8th Edition)\par Reporting of pT, pN, and (when applicable) pM categories is based\par on information available to the pathologist at the time the report\par is issued. As per the AJCC (Chapter 1, 8th Ed.) it is the managing\par physician's responsibility to establish the final pathologic stage based\par upon all pertinent information, including but potentially not limited to\par this pathology report.\par TNM Descriptors: Not applicable\par pT Category:  pT1a\par Regional Lymph Nodes Modifier:   (sn): [place] node(s) evaluated.\par pN Category:  pN0\par pM Category:  Not applicable - pM cannot be determined from the\par submitted specimen(s)\par Breast Biomarker Testing Performed on Previous Biopsy:\par Estrogen Receptor (ER)\par Estrogen Receptor (ER) Status:   Negative\par Progesterone Receptor (PgR)\par Progesterone Receptor (PgR) Status:   Negative\par HER2 (by immunohistochemistry)\par HER2 (by immunohistochemistry):   Negative (Score 0)\par Testing Performed on Case Number:   [id number]\par \par \par Today she presents for a consult regarding radiation therapy, referred by Dr. Haile. She had radiation delivered to the left breast approximately 20 years ago, and, after contacting that facility, they indicated these records are no longer available.  Her previous breast surgeon has passed away and there are no past radiation records available for review.  She is healing well from surgery.   The Right Breast/Axilla area skin is clean, dry and intact and incision sites are well approximated and well healed, one clear suture noted on chest incision, pt instructed per surgery team that it will fall out with time. Pt denies any breast pain or pain otherwise and denies nipple discharge. No upper extremity edema noted. Pt educated on the importance of  Aquaphor usage to the treated breast area during radiation therapy 2-3 times per day. Pt plans for consult with Dr. Peña on 3/29/22. \par

## 2022-06-07 ENCOUNTER — NON-APPOINTMENT (OUTPATIENT)
Age: 87
End: 2022-06-07

## 2022-06-13 ENCOUNTER — APPOINTMENT (OUTPATIENT)
Dept: BREAST CENTER | Facility: CLINIC | Age: 87
End: 2022-06-13
Payer: MEDICARE

## 2022-06-13 VITALS — WEIGHT: 150 LBS | OXYGEN SATURATION: 97 % | HEIGHT: 66 IN | BODY MASS INDEX: 24.11 KG/M2 | HEART RATE: 65 BPM

## 2022-06-13 PROCEDURE — 99213 OFFICE O/P EST LOW 20 MIN: CPT

## 2022-06-16 NOTE — HISTORY OF PRESENT ILLNESS
[FreeTextEntry1] : Wilda is an 88 yo female presents for follow up of right invasive ductal carcinoma, ER/[place]/HER-2 negative s/p right lumpectomy, right SLNB on 3/1/2022, final surgical pathology revealed 0.2cm of invasive tumor, all margins negative, 0/1 lymph node negative for disease. The patient met with Dr. Peña to discuss adjuvant systemic therapy; however, given the low risk of her disease, systemic chemotherapy was not recommended. Patient completed XRT therapy with Dr. Dawson on 5/18/2022 and is overall feeling well without complaint. \par \par The patient has a history of left breast DCIS, ER+, s/p lumpectomy & XRT in 2000. She is is s/p a 20 year course of Letrozole, completed in 2020.

## 2022-06-16 NOTE — DATA REVIEWED
[FreeTextEntry1] : --12/18/19 ([facility]) b/l breast mmg & US: heterogenously dense breasts. Benign mmg findings w/o interval change. Negative b/l US. BI-RADS 2. \par \par -- 1/13/21 ([facility]) b/l mmg & US: benign mmg w/o interval change; negative b/l US. BI-RADS 2. \par \par 1/18/22 ([facility]) heterogenously dense. New suspicious linear calcifications right breast. BI-RADS 4B.\par \par 1/26/22 ([facility]) Right breast  12:00, posterior, stereotactic core biopsy: Ductal carcinoma in situ, high nuclear grade, with foci highly suspicious for microinvasion, with central comedo-type necrosis and associated microcalcifications\par ADDENDUM: ESTROGEN RECEPTOR:  NEGATIVE  ( 0 STAINING)\par PROGESTERONE RECEPTOR: NEGATIVE  (0 STAINING)\par HER-2: NEGATIVE  \par ADDENDUM: Deeper levels and immunohistochemical stains were ordered on slide 1B to rule out microinvasion. However, on the deeper levels some of the ducts, which had previously appeared to have ductal carcinoma in situ, are now more irregularly shaped and invasive-appearing.  Immunohistochemistry for p63, SMMH and MSA confirms the absence of myoepithelial cells in these irregular ducts and thus they are invasive carcinoma. Other ducts remain as in situ carcinoma. Right breast, stereotactic core biopsy: INVASIVE DUCTAL CARCINOMA, HIGH GRADE, 4 MM ON THIS SLIDE\par \par 2/7/22 ([facility])b/l mmg & US:  heterogenously dense. 1.  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  Needle localization can be performed with mammography guidance. 2.  Unremarkable bilateral contrast mammography exam. 3.  No evidence of adenopathy in either axilla. BI-RADS 6. \par \par 2/9/22 ([facility]) genetic testing: negative. \par \par 3/1/22 ([facility]) right lumpectomy w/ SLN: 1. Breast, right; excision: - Invasive ductal carcinoma, poorly differentiated, 2 mm, see note and synoptic report - All margins over 5 mm - Biopsy site changes- Calcifications associated with wall of blood vessels and benign epithelium\par 2. Axilla, right, sentinel lymph node; biopsy: - One lymph node, negative for metastatic carcinoma (0/1)\par 3. Breast, new superior margin, excision: - Benign fibroadipose tissue\par 4. Breast, new inferior margin, excision: - Benign breast tissue - Calcifications associated with benign epithelium\par 5. Breast, new inferior margin #2; excision: - Benign breast tissue\par 6. Breast, right, new medial margin; excision: - Benign breast tissue\par 7. Breast, right, new lateral margin; excision: - Benign breast tissue\par 8. Breast, right, new anterior margin; excision: - Benign fibroadipose tissue

## 2022-06-16 NOTE — PHYSICAL EXAM
[Normocephalic] : normocephalic [Atraumatic] : atraumatic [Supple] : supple [No Supraclavicular Adenopathy] : no supraclavicular adenopathy [Examined in the supine and seated position] : examined in the supine and seated position [No dominant masses] : no dominant masses in right breast  [No dominant masses] : no dominant masses left breast [No Nipple Retraction] : no left nipple retraction [No Nipple Discharge] : no left nipple discharge [No Axillary Lymphadenopathy] : no left axillary lymphadenopathy [No Edema] : no edema [No Rashes] : no rashes [No Ulceration] : no ulceration [de-identified] : well healed right breast and axillary incisions

## 2022-07-21 ENCOUNTER — APPOINTMENT (OUTPATIENT)
Dept: RADIATION ONCOLOGY | Facility: CLINIC | Age: 87
End: 2022-07-21

## 2022-07-21 VITALS
WEIGHT: 150.4 LBS | SYSTOLIC BLOOD PRESSURE: 125 MMHG | TEMPERATURE: 97.8 F | OXYGEN SATURATION: 96 % | HEART RATE: 67 BPM | DIASTOLIC BLOOD PRESSURE: 86 MMHG | RESPIRATION RATE: 16 BRPM | BODY MASS INDEX: 24.28 KG/M2

## 2022-07-21 PROCEDURE — 99024 POSTOP FOLLOW-UP VISIT: CPT

## 2022-07-21 NOTE — HISTORY OF PRESENT ILLNESS
[FreeTextEntry1] : Ms. 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 On 3/1/22 she is s/p Right Lumpectomy per Dr. Haile with negative margins and (0/1 LN negative). On 05/18/2022 she completed 5240cGy to the Right Breast.\par \par 07/21/2022- PTE- Today she presents for follow up and states she overall feels well, denies fatigue. The RIGHT breast/axilla area shows no appreciable symptomatology related to her adjuvant radiation therapy, without appreciable hyperpigmentation, erythema, and desquamation. RIGHT breast/axilla area skin is clean, dry & intact. No palpable mass or induration noted. Pt denies any breast pain or pain otherwise and denies nipple discharge. No upper extremity edema noted. On 3/29/22 she consulted with Dr. Peña and pt not a candidate for endocrine therapy. She f/u with Dr. 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 Letrozole [id number]. \par \par --12/18/19 ([facility]) b/l breast mmg & US: heterogenously dense breasts. Benign mmg findings w/o interval change. Negative b/l US. BI-RADS 2. \par \par -- 1/13/21 ([facility]) b/l mmg & US: benign mmg w/o interval change; negative b/l US. BI-RADS 2. \par \par 1/18/22 ([facility]) heterogenously dense. New suspicious linear calcifications right breast. BI-RADS 4B.\par \par 1/26/22 ([facility]) Right breast 12:00, posterior, stereotactic core biopsy: Ductal carcinoma in situ, high nuclear grade, with foci highly suspicious for microinvasion, with central comedo-type necrosis and associated microcalcifications\par ADDENDUM: ESTROGEN RECEPTOR: NEGATIVE ( 0 STAINING)\par PROGESTERONE RECEPTOR: NEGATIVE (0 STAINING)\par HER-2: NEGATIVE \par ADDENDUM: Deeper levels and immunohistochemical stains were ordered on slide 1B to rule out microinvasion. However, on the deeper levels some of the ducts, which had previously appeared to have ductal carcinoma in situ, are now more irregularly shaped and invasive-appearing. Immunohistochemistry for p63, SMMH and MSA confirms the absence of myoepithelial cells in these irregular ducts and thus they are invasive carcinoma. Other ducts remain as in situ carcinoma. Right breast, stereotactic core biopsy: INVASIVE DUCTAL CARCINOMA, HIGH GRADE, 4 MM ON THIS SLIDE\par \par 2/7/22 ([facility])b/l mmg & US: heterogenously dense. 1. 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 Needle localization can be performed with mammography guidance. 2. Unremarkable bilateral contrast mammography exam. 3. No evidence of adenopathy in either axilla. BI-RADS 6. \par \par 2/9/22 ([facility]) genetic testing: negative. \par \par 3/1/22-  Right lumpectomy w/ SLN per Dr. Haile\par Final Diagnosis\par 1. Breast, right; excision:\par - Invasive ductal carcinoma, poorly differentiated, 2 mm, see note\par and synoptic report\par - All margins over 5 mm\par - Biopsy site changes\par - Calcifications associated with wall of blood vessels and benign\par epithelium\par \par 2. Axilla, right, sentinel lymph node; biopsy:\par - One lymph node, negative for metastatic carcinoma (0/1)\par \par 3. Breast, new superior margin, excision:\par - Benign fibroadipose tissue\par \par 4. Breast, new inferior margin, excision:\par - Benign breast tissue\par - Calcifications associated with benign epithelium\par \par 5. Breast, new inferior margin #2; excision:\par - Benign breast tissue\par \par 6. Breast, right, new medial margin; excision:\par - Benign breast tissue\par \par 7. Breast, right, new lateral margin; excision:\par - Benign breast tissue\par \par 8. Breast, right, new anterior margin; excision:\par - Benign fibroadipose tissue\par ___________________________________________\par \par Synoptic Summary\par \par INVASIVE CARCINOMA OF THE BREAST: Resection\par SPECIMEN\par Excision (less than total mastectomy)\par Procedure:\par Specimen Laterality:  Right\par TUMOR\par Histologic Type:  Invasive carcinoma of no special type (ductal)\par Glandular (Acinar) / Tubular Differentiation:   Score 3\par Nuclear Pleomorphism:  Score 3\par Mitotic Rate:  Score 3\par Overall Grade:  Grade 3 (scores of 8 or 9)\par Tumor Size: Greatest dimension of largest invasive focus (Millimeters)\par - 4 mm\par Ductal Carcinoma In Situ (DCIS):   Not identified\par Tumor Extent\par Tumor Extent:  Not applicable\par Treatment Effect in the Breast:   No known presurgical therapy\par Treatment Effect in the Lymph Nodes:   Not applicable\par MARGINS\par Margin Status for Invasive Carcinoma:   All margins negative for invasive\par carcinoma\par Distance from Invasive Carcinoma to Closest Margin: Greater than -     5\par mm\par Margin Status for DCIS:  Not applicable (no DCIS in specimen)\par REGIONAL LYMPH NODES\par Regional Lymph Node Status:  All regional lymph nodes negative for tumor\par Total Number of Lymph Nodes Examined (sentinel and non-sentinel):     1\par Number of [place] Nodes Examined:   1\par DISTANT METASTASIS\par Distant Site(s) Involved:  Not applicable\par PATHOLOGIC STAGE CLASSIFICATION (pTNM, AJCC 8th Edition)\par Reporting of pT, pN, and (when applicable) pM categories is based\par on information available to the pathologist at the time the report\par is issued. As per the AJCC (Chapter 1, 8th Ed.) it is the managing\par physician's responsibility to establish the final pathologic stage based\par upon all pertinent information, including but potentially not limited to\par this pathology report.\par TNM Descriptors: Not applicable\par pT Category:  pT1a\par Regional Lymph Nodes Modifier:   (sn): [place] node(s) evaluated.\par pN Category:  pN0\par pM Category:  Not applicable - pM cannot be determined from the\par submitted specimen(s)\par Breast Biomarker Testing Performed on Previous Biopsy:\par Estrogen Receptor (ER)\par Estrogen Receptor (ER) Status:   Negative\par Progesterone Receptor (PgR)\par Progesterone Receptor (PgR) Status:   Negative\par HER2 (by immunohistochemistry)\par HER2 (by immunohistochemistry):   Negative (Score 0)\par Testing Performed on Case Number:   [id number]\par \par \par Today she presents for a consult regarding radiation therapy, referred by Dr. Haile. She had radiation delivered to the left breast approximately 20 years ago, and, after contacting that facility, they indicated these records are no longer available.  Her previous breast surgeon has passed away and there are no past radiation records available for review.  She is healing well from surgery.   The Right Breast/Axilla area skin is clean, dry and intact and incision sites are well approximated and well healed, one clear suture noted on chest incision, pt instructed per surgery team that it will fall out with time. Pt denies any breast pain or pain otherwise and denies nipple discharge. No upper extremity edema noted. Pt educated on the importance of  Aquaphor usage to the treated breast area during radiation therapy 2-3 times per day. Pt plans for consult with Dr. Peña on 3/29/22. \par

## 2022-07-21 NOTE — PHYSICAL EXAM
[] : no respiratory distress [Breast Palpation Mass] : no palpable masses [Breast Abnormal Lactation (Galactorrhea)] : no nipple discharge [No UE Edema] : there is no upper extremity edema [Exaggerated Use Of Accessory Muscles For Inspiration] : no accessory muscle use [Normal] : no palpable adenopathy

## 2022-07-21 NOTE — DISEASE MANAGEMENT
[Pathological] : TNM Stage: p [IA] : IA [TTNM] : 1a [NTNM] : 0 [MTNM] : x [de-identified] : 9964 [de-identified] : [id number] [de-identified] : Right Breast

## 2023-01-03 ENCOUNTER — APPOINTMENT (OUTPATIENT)
Dept: ULTRASOUND IMAGING | Facility: HOSPITAL | Age: 88
End: 2023-01-03
Payer: MEDICARE

## 2023-01-03 ENCOUNTER — RESULT REVIEW (OUTPATIENT)
Age: 88
End: 2023-01-03

## 2023-01-03 ENCOUNTER — OUTPATIENT (OUTPATIENT)
Dept: OUTPATIENT SERVICES | Facility: HOSPITAL | Age: 88
LOS: 1 days | End: 2023-01-03
Payer: MEDICARE

## 2023-01-03 ENCOUNTER — APPOINTMENT (OUTPATIENT)
Dept: MAMMOGRAPHY | Facility: HOSPITAL | Age: 88
End: 2023-01-03
Payer: MEDICARE

## 2023-01-03 DIAGNOSIS — E89.0 POSTPROCEDURAL HYPOTHYROIDISM: Chronic | ICD-10-CM

## 2023-01-03 DIAGNOSIS — Z98.890 OTHER SPECIFIED POSTPROCEDURAL STATES: Chronic | ICD-10-CM

## 2023-01-03 PROCEDURE — 77063 BREAST TOMOSYNTHESIS BI: CPT | Mod: 26

## 2023-01-03 PROCEDURE — 77067 SCR MAMMO BI INCL CAD: CPT

## 2023-01-03 PROCEDURE — 76641 ULTRASOUND BREAST COMPLETE: CPT | Mod: 26,50

## 2023-01-03 PROCEDURE — 77063 BREAST TOMOSYNTHESIS BI: CPT

## 2023-01-03 PROCEDURE — 76641 ULTRASOUND BREAST COMPLETE: CPT

## 2023-01-03 PROCEDURE — 77067 SCR MAMMO BI INCL CAD: CPT | Mod: 26

## 2023-01-04 ENCOUNTER — NON-APPOINTMENT (OUTPATIENT)
Age: 88
End: 2023-01-04

## 2023-01-05 ENCOUNTER — APPOINTMENT (OUTPATIENT)
Dept: RADIATION ONCOLOGY | Facility: CLINIC | Age: 88
End: 2023-01-05
Payer: MEDICARE

## 2023-01-05 VITALS
TEMPERATURE: 97.5 F | BODY MASS INDEX: 24.18 KG/M2 | WEIGHT: 149.8 LBS | RESPIRATION RATE: 18 BRPM | OXYGEN SATURATION: 99 % | DIASTOLIC BLOOD PRESSURE: 92 MMHG | HEART RATE: 65 BPM | SYSTOLIC BLOOD PRESSURE: 148 MMHG

## 2023-01-05 PROCEDURE — 99215 OFFICE O/P EST HI 40 MIN: CPT

## 2023-01-05 NOTE — DISEASE MANAGEMENT
[Pathological] : TNM Stage: p [IA] : IA [TTNM] : 1a [NTNM] : 0 [MTNM] : x [de-identified] : [id number] [de-identified] : [id number] [de-identified] : Right Breast

## 2023-01-05 NOTE — HISTORY OF PRESENT ILLNESS
[FreeTextEntry1] : Ms. Wilda Alfred is an 87 year old female diagnosed with RIGHT Breast, 4mm (per synoptic report) poorly differentiated IDC, (ER- RI- HER2 -), pT1aN0, Stage 1A, [facility] genetic testing negative. On 3/1/22 she is s/p Right Lumpectomy per Dr. Haile with negative margins and (0/1 LN negative). On 05/18/2022 she completed 5240cGy to the Right Breast.\par \par 1/5/2023 - RPA - pt presents for follow-up. Next appt with breast surg 1/27/23. Reports feeling well overall. Denies pain, nipple discharge, swelling, fatigue, or loss of appetite. Not currently on endocrine therapy. MMG from 1/3/23 - BI-RADS 2. \par On assessment, the breast is soft and warm to touch, skin color is consistent with tone. She is using Cerave daily. \par \par 07/21/2022- PTE- Today she presents for follow up and states she overall feels well, denies fatigue. The RIGHT breast/axilla area shows no appreciable symptomatology related to her adjuvant radiation therapy, without appreciable hyperpigmentation, erythema, and desquamation. RIGHT breast/axilla area skin is clean, dry & intact. No palpable mass or induration noted. Pt denies any breast pain or pain otherwise and denies nipple discharge. No upper extremity edema noted. On 3/29/22 she consulted with Dr. Peña and pt not a candidate for endocrine therapy. She f/u with Dr. 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 Letrozole [id number]. \par \par --12/18/19 ([facility]) b/l breast mmg & US: heterogenously dense breasts. Benign mmg findings w/o interval change. Negative b/l US. BI-RADS 2. \par \par -- 1/13/21 ([facility]) b/l mmg & US: benign mmg w/o interval change; negative b/l US. BI-RADS 2. \par \par 1/18/22 ([facility]) heterogenously dense. New suspicious linear calcifications right breast. BI-RADS 4B.\par \par 1/26/22 ([facility]) Right breast 12:00, posterior, stereotactic core biopsy: Ductal carcinoma in situ, high nuclear grade, with foci highly suspicious for microinvasion, with central comedo-type necrosis and associated microcalcifications\par ADDENDUM: ESTROGEN RECEPTOR: NEGATIVE ( 0 STAINING)\par PROGESTERONE RECEPTOR: NEGATIVE (0 STAINING)\par HER-2: NEGATIVE \par ADDENDUM: Deeper levels and immunohistochemical stains were ordered on slide 1B to rule out microinvasion. However, on the deeper levels some of the ducts, which had previously appeared to have ductal carcinoma in situ, are now more irregularly shaped and invasive-appearing. Immunohistochemistry for p63, SMMH and MSA confirms the absence of myoepithelial cells in these irregular ducts and thus they are invasive carcinoma. Other ducts remain as in situ carcinoma. Right breast, stereotactic core biopsy: INVASIVE DUCTAL CARCINOMA, HIGH GRADE, 4 MM ON THIS SLIDE\par \par 2/7/22 ([facility])b/l mmg & US: heterogenously dense. 1. 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 Needle localization can be performed with mammography guidance. 2. Unremarkable bilateral contrast mammography exam. 3. No evidence of adenopathy in either axilla. BI-RADS 6. \par \par 2/9/22 ([facility]) genetic testing: negative. \par \par 3/1/22-  Right lumpectomy w/ SLN per Dr. Haile\par Final Diagnosis\par 1. Breast, right; excision:\par - Invasive ductal carcinoma, poorly differentiated, 2 mm, see note\par and synoptic report\par - All margins over 5 mm\par - Biopsy site changes\par - Calcifications associated with wall of blood vessels and benign\par epithelium\par \par 2. Axilla, right, sentinel lymph node; biopsy:\par - One lymph node, negative for metastatic carcinoma (0/1)\par \par 3. Breast, new superior margin, excision:\par - Benign fibroadipose tissue\par \par 4. Breast, new inferior margin, excision:\par - Benign breast tissue\par - Calcifications associated with benign epithelium\par \par 5. Breast, new inferior margin #2; excision:\par - Benign breast tissue\par \par 6. Breast, right, new medial margin; excision:\par - Benign breast tissue\par \par 7. Breast, right, new lateral margin; excision:\par - Benign breast tissue\par \par 8. Breast, right, new anterior margin; excision:\par - Benign fibroadipose tissue\par ___________________________________________\par \par Synoptic Summary\par \par INVASIVE CARCINOMA OF THE BREAST: Resection\par SPECIMEN\par Excision (less than total mastectomy)\par Procedure:\par Specimen Laterality:  Right\par TUMOR\par Histologic Type:  Invasive carcinoma of no special type (ductal)\par Glandular (Acinar) / Tubular Differentiation:   Score 3\par Nuclear Pleomorphism:  Score 3\par Mitotic Rate:  Score 3\par Overall Grade:  Grade 3 (scores of 8 or 9)\par Tumor Size: Greatest dimension of largest invasive focus (Millimeters)\par - 4 mm\par Ductal Carcinoma In Situ (DCIS):   Not identified\par Tumor Extent\par Tumor Extent:  Not applicable\par Treatment Effect in the Breast:   No known presurgical therapy\par Treatment Effect in the Lymph Nodes:   Not applicable\par MARGINS\par Margin Status for Invasive Carcinoma:   All margins negative for invasive\par carcinoma\par Distance from Invasive Carcinoma to Closest Margin: Greater than -     5\par mm\par Margin Status for DCIS:  Not applicable (no DCIS in specimen)\par REGIONAL LYMPH NODES\par Regional Lymph Node Status:  All regional lymph nodes negative for tumor\par Total Number of Lymph Nodes Examined (sentinel and non-sentinel):     1\par Number of [place] Nodes Examined:   1\par DISTANT METASTASIS\par Distant Site(s) Involved:  Not applicable\par PATHOLOGIC STAGE CLASSIFICATION (pTNM, AJCC 8th Edition)\par Reporting of pT, pN, and (when applicable) pM categories is based\par on information available to the pathologist at the time the report\par is issued. As per the AJCC (Chapter 1, 8th Ed.) it is the managing\par physician's responsibility to establish the final pathologic stage based\par upon all pertinent information, including but potentially not limited to\par this pathology report.\par TNM Descriptors: Not applicable\par pT Category:  pT1a\par Regional Lymph Nodes Modifier:   (sn): [place] node(s) evaluated.\par pN Category:  pN0\par pM Category:  Not applicable - pM cannot be determined from the\par submitted specimen(s)\par Breast Biomarker Testing Performed on Previous Biopsy:\par Estrogen Receptor (ER)\par Estrogen Receptor (ER) Status:   Negative\par Progesterone Receptor (PgR)\par Progesterone Receptor (PgR) Status:   Negative\par HER2 (by immunohistochemistry)\par HER2 (by immunohistochemistry):   Negative (Score 0)\par Testing Performed on Case Number:   [id number]\par \par \par Today she presents for a consult regarding radiation therapy, referred by Dr. Haile. She had radiation delivered to the left breast approximately 20 years ago, and, after contacting that facility, they indicated these records are no longer available.  Her previous breast surgeon has passed away and there are no past radiation records available for review.  She is healing well from surgery.   The Right Breast/Axilla area skin is clean, dry and intact and incision sites are well approximated and well healed, one clear suture noted on chest incision, pt instructed per surgery team that it will fall out with time. Pt denies any breast pain or pain otherwise and denies nipple discharge. No upper extremity edema noted. Pt educated on the importance of  Aquaphor usage to the treated breast area during radiation therapy 2-3 times per day. Pt plans for consult with Dr. Peña on 3/29/22. \par

## 2023-01-05 NOTE — PHYSICAL EXAM
[General Appearance - Well Developed] : well developed [General Appearance - Well Nourished] : well nourished [General Appearance - Alert] : alert [General Appearance - In No Acute Distress] : in no acute distress [Normal] : normal scars, no masses, no nipple discharge [Breast Appearance] : normal in appearance [Symmetric] : breasts are symmetric [Breast Palpation Mass] : no palpable masses [Breast Abnormal Lactation (Galactorrhea)] : no nipple discharge [No UE Edema] : there is no upper extremity edema [de-identified] : prominent sternum, bruise apparent at manubrium, no lumps or bumps bilaterally, no lymphadenopathy biltareally, no hyperpigmentation, scars are well healed

## 2023-01-27 ENCOUNTER — APPOINTMENT (OUTPATIENT)
Dept: BREAST CENTER | Facility: CLINIC | Age: 88
End: 2023-01-27
Payer: MEDICARE

## 2023-01-27 ENCOUNTER — NON-APPOINTMENT (OUTPATIENT)
Age: 88
End: 2023-01-27

## 2023-01-27 VITALS — HEIGHT: 66 IN | WEIGHT: 149 LBS | OXYGEN SATURATION: 97 % | BODY MASS INDEX: 23.95 KG/M2 | HEART RATE: 71 BPM

## 2023-01-27 DIAGNOSIS — Z17.1 MALIGNANT NEOPLASM OF UNSPECIFIED SITE OF UNSPECIFIED FEMALE BREAST: ICD-10-CM

## 2023-01-27 DIAGNOSIS — C50.919 MALIGNANT NEOPLASM OF UNSPECIFIED SITE OF UNSPECIFIED FEMALE BREAST: ICD-10-CM

## 2023-01-27 PROCEDURE — 99213 OFFICE O/P EST LOW 20 MIN: CPT

## 2023-01-27 NOTE — DISCUSSION/SUMMARY
[Follow up with Radiation MD in _____] : Follow up with Radiation MD in [unfilled] [Scans: ______] : Scans: [unfilled] [Primary care physician] : primary care physician [Skin Checks] : skin checks [Mammogram] : Mammogram [Cholesterol Test] : cholesterol test [Annual Flu Shot] : annual flu shot [Other: _____] : [unfilled] [Pain] : pain [Sleep Disorders] : sleep disorders [Emotional and mental health] : Emotional and mental health [Fatigue] : Fatigue [Insurance] : Insurance [Weight Changes] : Weight changes [Surgery] : Surgery: Yes [Radiation] : Radiation: Yes [Body Area Treated: _________] : Body Area Treated: [unfilled] [Path to Wellness Survivorship Program] : Path to Wellness Survivorship Program [Cancer Type / Location / Histology Subtype: ________] : Cancer Type / Location / Histology Subtype: [unfilled] [Diagnosis Date (year): ____] : Diagnosis Date (year): [unfilled] [Surgery Date(s) (year): ____] : Surgery Date(s) (year): [unfilled] [Surgical Procedure / Location / Findings: _________] : Surgical Procedure / Location / Findings: [unfilled] [End Date (year): ____] : End Date (year): [unfilled] [Systemic Therapy (chemotherapy, hormonal therapy, other)] : Systemic Therapy (chemotherapy, hormonal therapy, other): No [Persistent symptoms or side effects at completion of treatment?  If Yes, list types ___] : Persistent symptoms or side effects at completion of treatment? No [Need for onging (adjuvant) treatment for cancer?] : Need for onging (adjuvant) treatment for cancer? No [de-identified] : Has a dermatologist, PCP is Dr. Duron (check to make sure up to date on vaccinations)  [FreeTextEntry7] [facility] [FreeTextEntry8] : Annamaria Burgess [FreeTextEntry9] : 1/27/2023

## 2023-01-30 PROBLEM — C50.919: Status: ACTIVE | Noted: 2022-03-29

## 2023-01-30 NOTE — ASSESSMENT
[FreeTextEntry1] : 86 yo female with a history of breast cancer presents for survivorship. Survivorship care plan was reviewed in detail and provided to the patient. Recent mammogram and ultrasound was benign, will plan on diagnostic contrast mammograms moving forward as the patient declines MRIs. She should RTC in 6 months for another physical exam, future imaging will be ordered at that time. The patient verbalized understanding and is in agreement with the plan.

## 2023-01-30 NOTE — HISTORY OF PRESENT ILLNESS
[FreeTextEntry1] : Wilda is an 88 yo female presents for follow up of right invasive ductal carcinoma, ER/RI/HER-2 negative s/p right lumpectomy, right SLNB on 3/1/2022, final surgical pathology revealed 0.2cm of invasive tumor, all margins negative, 0/1 lymph node negative for disease. The patient met with Dr. Peña to discuss adjuvant systemic therapy; however, given the low risk of her disease, systemic chemotherapy was not recommended. Patient completed XRT therapy with Dr. Dawson on 5/18/2022 and is overall feeling well without complaint. \par \par The patient has a history of left breast DCIS, ER+, s/p lumpectomy & XRT in 2000. She is is s/p a 20 year course of Letrozole, completed in 2020.

## 2023-01-30 NOTE — PHYSICAL EXAM
[Normocephalic] : normocephalic [No Supraclavicular Adenopathy] : no supraclavicular adenopathy [Examined in the supine and seated position] : examined in the supine and seated position [Symmetrical] : symmetrical [No dominant masses] : no dominant masses in right breast  [No dominant masses] : no dominant masses left breast [No Nipple Retraction] : no left nipple retraction [No Nipple Discharge] : no left nipple discharge [No Axillary Lymphadenopathy] : no left axillary lymphadenopathy [No Edema] : no edema [No Rashes] : no rashes [No Ulceration] : no ulceration [Breast Nipple Inversion] : nipples not inverted [Breast Nipple Retraction] : nipples not retracted [Breast Nipple Flattening] : nipples not flattened [Breast Nipple Fissures] : nipples not fissured [de-identified] : surgical incisions to superior breast and axilla are well healed

## 2023-01-30 NOTE — DATA REVIEWED
[FreeTextEntry1] : --12/18/19 ([facility]) b/l breast mmg & US: heterogenously dense breasts. Benign mmg findings w/o interval change. Negative b/l US. BI-RADS 2. \par \par -- 1/13/21 ([facility]) b/l mmg & US: benign mmg w/o interval change; negative b/l US. BI-RADS 2. \par \par 1/18/22 ([facility]) heterogenously dense. New suspicious linear calcifications right breast. BI-RADS 4B.\par \par 1/26/22 ([facility]) Right breast  12:00, posterior, stereotactic core biopsy: Ductal carcinoma in situ, high nuclear grade, with foci highly suspicious for microinvasion, with central comedo-type necrosis and associated microcalcifications\par ADDENDUM: ESTROGEN RECEPTOR:  NEGATIVE  ( 0 STAINING)\par PROGESTERONE RECEPTOR: NEGATIVE  (0 STAINING)\par HER-2: NEGATIVE  \par ADDENDUM: Deeper levels and immunohistochemical stains were ordered on slide 1B to rule out microinvasion. However, on the deeper levels some of the ducts, which had previously appeared to have ductal carcinoma in situ, are now more irregularly shaped and invasive-appearing.  Immunohistochemistry for p63, SMMH and MSA confirms the absence of myoepithelial cells in these irregular ducts and thus they are invasive carcinoma. Other ducts remain as in situ carcinoma. Right breast, stereotactic core biopsy: INVASIVE DUCTAL CARCINOMA, HIGH GRADE, 4 MM ON THIS SLIDE\par \par 2/7/22 ([facility])b/l mmg & US:  heterogenously dense. 1.  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  Needle localization can be performed with mammography guidance. 2.  Unremarkable bilateral contrast mammography exam. 3.  No evidence of adenopathy in either axilla. BI-RADS 6. \par \par 2/9/22 ([facility]) genetic testing: negative. \par \par 3/1/22 ([facility]) right lumpectomy w/ SLN: 1. Breast, right; excision: - Invasive ductal carcinoma, poorly differentiated, 2 mm, see note and synoptic report - All margins over 5 mm - Biopsy site changes- Calcifications associated with wall of blood vessels and benign epithelium\par 2. Axilla, right, sentinel lymph node; biopsy: - One lymph node, negative for metastatic carcinoma (0/1)\par 3. Breast, new superior margin, excision: - Benign fibroadipose tissue\par 4. Breast, new inferior margin, excision: - Benign breast tissue - Calcifications associated with benign epithelium\par 5. Breast, new inferior margin #2; excision: - Benign breast tissue\par 6. Breast, right, new medial margin; excision: - Benign breast tissue\par 7. Breast, right, new lateral margin; excision: - Benign breast tissue\par 8. Breast, right, new anterior margin; excision: - Benign fibroadipose tissue \par \par 1/3/23 ([facility]) B/l sMmg and US: heterogenously dense. No suspicious findings. Fat necrosis in the right upper outer quadrant. Recommend mammo and US in 1 year. BI-RADS 2.

## 2023-01-30 NOTE — REVIEW OF SYSTEMS
[Negative] : Heme/Lymph [Fever] : no fever [Chills] : no chills [Feeling Poorly] : not feeling poorly [Feeling Tired] : not feeling tired [Skin Lesions] : no skin lesions [Skin Wound] : no skin wound [Breast Pain] : no breast pain [Breast Lump] : no breast lump

## 2023-05-17 NOTE — PACU DISCHARGE NOTE - MENTAL STATUS: PATIENT PARTICIPATION
Awake Detail Level: Detailed Biopsy Photograph Reviewed: Yes Number Of Curettages: 3 Size Of Lesion In Cm: 0.5 Size Of Lesion After Curettage: 0.7 Add Intralesional Injection: No Total Volume (Ccs): 1 Anesthesia Type: 1% lidocaine with epinephrine Cautery Type: electrodesiccation What Was Performed First?: Curettage Final Size Statement: The size of the lesion after curettage was Additional Information: (Optional): The wound was cleaned, and a pressure dressing was applied.  The patient received detailed post-op instructions. Consent was obtained from the patient. The risks, benefits and alternatives to therapy were discussed in detail. Specifically, the risks of infection, scarring, bleeding, prolonged wound healing, nerve injury, incomplete removal, allergy to anesthesia and recurrence were addressed. Alternatives to ED&C, such as: surgical removal and XRT were also discussed.  Prior to the procedure, the treatment site was clearly identified and confirmed by the patient. All components of Universal Protocol/PAUSE Rule completed. Post-Care Instructions: I reviewed with the patient in detail post-care instructions. Patient is to keep the area dry for 48 hours, and not to engage in any swimming until the area is healed. Should the patient develop any fevers, chills, bleeding, severe pain patient will contact the office immediately. Bill As A Line Item Or As Units: Line Item

## 2023-05-26 ENCOUNTER — APPOINTMENT (OUTPATIENT)
Dept: RADIATION ONCOLOGY | Facility: CLINIC | Age: 88
End: 2023-05-26

## 2023-06-06 ENCOUNTER — APPOINTMENT (OUTPATIENT)
Dept: RADIATION ONCOLOGY | Facility: CLINIC | Age: 88
End: 2023-06-06
Payer: MEDICARE

## 2023-06-06 VITALS
SYSTOLIC BLOOD PRESSURE: 130 MMHG | RESPIRATION RATE: 16 BRPM | HEART RATE: 65 BPM | WEIGHT: 149 LBS | TEMPERATURE: 97.4 F | DIASTOLIC BLOOD PRESSURE: 91 MMHG | BODY MASS INDEX: 24.05 KG/M2 | OXYGEN SATURATION: 98 %

## 2023-06-06 PROCEDURE — 99215 OFFICE O/P EST HI 40 MIN: CPT

## 2023-06-06 RX ORDER — COLLAGENASE SANTYL 250 [ARB'U]/G
250 OINTMENT TOPICAL
Qty: 60 | Refills: 0 | Status: DISCONTINUED | COMMUNITY
Start: 2019-12-28 | End: 2023-06-06

## 2023-06-10 NOTE — HISTORY OF PRESENT ILLNESS
[FreeTextEntry1] : Ms. 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 On 3/1/22 she is s/p Right Lumpectomy per Dr. Haile with negative margins and (0/1 LN negative). On 05/18/2022 she completed 5240cGy to the Right Breast.\par \par 6/6/2023 - RPA - pt presents for follow up. Patient reports feeling well overall. Endorses intermittent shooting sensation in breast that resolves without intervention. Will meet with breast surg NP next month to discuss schedule of future imaging.\par \par 1/5/2023 - RPA - pt presents for follow-up. Next appt with breast surg 1/27/23. Reports feeling well overall. Denies pain, nipple discharge, swelling, fatigue, or loss of appetite. Not currently on endocrine therapy. MMG from 1/3/23 - BI-RADS 2. \par On assessment, the breast is soft and warm to touch, skin color is consistent with tone. She is using Cerave daily. \par \par 07/21/2022- PTE- Today she presents for follow up and states she overall feels well, denies fatigue. The RIGHT breast/axilla area shows no appreciable symptomatology related to her adjuvant radiation therapy, without appreciable hyperpigmentation, erythema, and desquamation. RIGHT breast/axilla area skin is clean, dry & intact. No palpable mass or induration noted. Pt denies any breast pain or pain otherwise and denies nipple discharge. No upper extremity edema noted. On 3/29/22 she consulted with Dr. Peña and pt not a candidate for endocrine therapy. She f/u with Dr. Haile on 6/13/22 and next US/Mammogram planed for Jan 2023.\par \par \par \par \par Oncological Hx:\par Prior history of Left microinvasive ductal carcinoma s/p 8/24/2000 left lumpectomy and left ALD (Audelia Hogan @ [facility]) + XRT in 2000 (Unable to obtain RT records from [facility]). Letrozole [id number]. \par \par --12/18/19 ([facility]) b/l breast mmg & US: heterogenously dense breasts. Benign mmg findings w/o interval change. Negative b/l US. BI-RADS 2. \par \par -- 1/13/21 ([facility]) b/l mmg & US: benign mmg w/o interval change; negative b/l US. BI-RADS 2. \par \par 1/18/22 ([facility]) heterogenously dense. New suspicious linear calcifications right breast. BI-RADS 4B.\par \par 1/26/22 ([facility]) Right breast 12:00, posterior, stereotactic core biopsy: Ductal carcinoma in situ, high nuclear grade, with foci highly suspicious for microinvasion, with central comedo-type necrosis and associated microcalcifications\par ADDENDUM: ESTROGEN RECEPTOR: NEGATIVE ( 0 STAINING)\par PROGESTERONE RECEPTOR: NEGATIVE (0 STAINING)\par HER-2: NEGATIVE \par ADDENDUM: Deeper levels and immunohistochemical stains were ordered on slide 1B to rule out microinvasion. However, on the deeper levels some of the ducts, which had previously appeared to have ductal carcinoma in situ, are now more irregularly shaped and invasive-appearing. Immunohistochemistry for p63, SMMH and MSA confirms the absence of myoepithelial cells in these irregular ducts and thus they are invasive carcinoma. Other ducts remain as in situ carcinoma. Right breast, stereotactic core biopsy: INVASIVE DUCTAL CARCINOMA, HIGH GRADE, 4 MM ON THIS SLIDE\par \par 2/7/22 ([facility])b/l mmg & US: heterogenously dense. 1. 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 Needle localization can be performed with mammography guidance. 2. Unremarkable bilateral contrast mammography exam. 3. No evidence of adenopathy in either axilla. BI-RADS 6. \par \par 2/9/22 ([facility]) genetic testing: negative. \par \par 3/1/22-  Right lumpectomy w/ SLN per Dr. Haile\par Final Diagnosis\par 1. Breast, right; excision:\par - Invasive ductal carcinoma, poorly differentiated, 2 mm, see note\par and synoptic report\par - All margins over 5 mm\par - Biopsy site changes\par - Calcifications associated with wall of blood vessels and benign\par epithelium\par \par 2. Axilla, right, sentinel lymph node; biopsy:\par - One lymph node, negative for metastatic carcinoma (0/1)\par \par 3. Breast, new superior margin, excision:\par - Benign fibroadipose tissue\par \par 4. Breast, new inferior margin, excision:\par - Benign breast tissue\par - Calcifications associated with benign epithelium\par \par 5. Breast, new inferior margin #2; excision:\par - Benign breast tissue\par \par 6. Breast, right, new medial margin; excision:\par - Benign breast tissue\par \par 7. Breast, right, new lateral margin; excision:\par - Benign breast tissue\par \par 8. Breast, right, new anterior margin; excision:\par - Benign fibroadipose tissue\par ___________________________________________\par \par Synoptic Summary\par \par INVASIVE CARCINOMA OF THE BREAST: Resection\par SPECIMEN\par Excision (less than total mastectomy)\par Procedure:\par Specimen Laterality:  Right\par TUMOR\par Histologic Type:  Invasive carcinoma of no special type (ductal)\par Glandular (Acinar) / Tubular Differentiation:   Score 3\par Nuclear Pleomorphism:  Score 3\par Mitotic Rate:  Score 3\par Overall Grade:  Grade 3 (scores of 8 or 9)\par Tumor Size: Greatest dimension of largest invasive focus (Millimeters)\par - 4 mm\par Ductal Carcinoma In Situ (DCIS):   Not identified\par Tumor Extent\par Tumor Extent:  Not applicable\par Treatment Effect in the Breast:   No known presurgical therapy\par Treatment Effect in the Lymph Nodes:   Not applicable\par MARGINS\par Margin Status for Invasive Carcinoma:   All margins negative for invasive\par carcinoma\par Distance from Invasive Carcinoma to Closest Margin: Greater than -     5\par mm\par Margin Status for DCIS:  Not applicable (no DCIS in specimen)\par REGIONAL LYMPH NODES\par Regional Lymph Node Status:  All regional lymph nodes negative for tumor\par Total Number of Lymph Nodes Examined (sentinel and non-sentinel):     1\par Number of [place] Nodes Examined:   1\par DISTANT METASTASIS\par Distant Site(s) Involved:  Not applicable\par PATHOLOGIC STAGE CLASSIFICATION (pTNM, AJCC 8th Edition)\par Reporting of pT, pN, and (when applicable) pM categories is based\par on information available to the pathologist at the time the report\par is issued. As per the AJCC (Chapter 1, 8th Ed.) it is the managing\par physician's responsibility to establish the final pathologic stage based\par upon all pertinent information, including but potentially not limited to\par this pathology report.\par TNM Descriptors: Not applicable\par pT Category:  pT1a\par Regional Lymph Nodes Modifier:   (sn): [place] node(s) evaluated.\par pN Category:  pN0\par pM Category:  Not applicable - pM cannot be determined from the\par submitted specimen(s)\par Breast Biomarker Testing Performed on Previous Biopsy:\par Estrogen Receptor (ER)\par Estrogen Receptor (ER) Status:   Negative\par Progesterone Receptor (PgR)\par Progesterone Receptor (PgR) Status:   Negative\par HER2 (by immunohistochemistry)\par HER2 (by immunohistochemistry):   Negative (Score 0)\par Testing Performed on Case Number:   [id number]\par \par \par Today she presents for a consult regarding radiation therapy, referred by Dr. Haile. She had radiation delivered to the left breast approximately 20 years ago, and, after contacting that facility, they indicated these records are no longer available.  Her previous breast surgeon has passed away and there are no past radiation records available for review.  She is healing well from surgery.   The Right Breast/Axilla area skin is clean, dry and intact and incision sites are well approximated and well healed, one clear suture noted on chest incision, pt instructed per surgery team that it will fall out with time. Pt denies any breast pain or pain otherwise and denies nipple discharge. No upper extremity edema noted. Pt educated on the importance of  Aquaphor usage to the treated breast area during radiation therapy 2-3 times per day. Pt plans for consult with Dr. Peña on 3/29/22. \par

## 2023-06-10 NOTE — VITALS
[NoTreatment Scheduled] : no treatment scheduled [Maximal Pain Intensity: 2/10] : 2/10 [Least Pain Intensity: 0/10] : 0/10 [90: Able to carry normal activity; minor signs or symptoms of disease.] : 90: Able to carry normal activity; minor signs or symptoms of disease.  [ECOG Performance Status: 1 - Restricted in physically strenuous activity but ambulatory and able to carry out work of a light or sedentary nature] : Performance Status: 1 - Restricted in physically strenuous activity but ambulatory and able to carry out work of a light or sedentary nature, e.g., light house work, office work

## 2023-06-10 NOTE — DISEASE MANAGEMENT
[Pathological] : TNM Stage: p [IA] : IA [TTNM] : 1a [NTNM] : 0 [MTNM] : x [de-identified] : 8580 [de-identified] : 0830 [de-identified] : Right Breast

## 2023-06-10 NOTE — PHYSICAL EXAM
[Normal] : well developed, well nourished, in no acute distress [Hearing Threshold Finger Rub Not [place]] : hearing was normal [] : no respiratory distress [Exaggerated Use Of Accessory Muscles For Inspiration] : no accessory muscle use [Nondistended] : nondistended [de-identified] : Well healed R breast incisions, no abnormal masses palpated

## 2023-07-28 ENCOUNTER — APPOINTMENT (OUTPATIENT)
Dept: BREAST CENTER | Facility: CLINIC | Age: 88
End: 2023-07-28
Payer: MEDICARE

## 2023-07-28 VITALS
HEART RATE: 64 BPM | SYSTOLIC BLOOD PRESSURE: 109 MMHG | HEIGHT: 66 IN | BODY MASS INDEX: 24.11 KG/M2 | DIASTOLIC BLOOD PRESSURE: 74 MMHG | WEIGHT: 150 LBS

## 2023-07-28 DIAGNOSIS — Z85.3 PERSONAL HISTORY OF MALIGNANT NEOPLASM OF BREAST: ICD-10-CM

## 2023-07-28 PROCEDURE — 99213 OFFICE O/P EST LOW 20 MIN: CPT

## 2023-07-28 RX ORDER — CALCIUM CITRATE/VITAMIN D3 315MG-6.25
TABLET ORAL
Refills: 0 | Status: ACTIVE | COMMUNITY

## 2023-07-28 NOTE — HISTORY OF PRESENT ILLNESS
[FreeTextEntry1] : Wilda is an 87 yo female presents for follow up of right invasive ductal carcinoma, ER/CO/HER-2 negative s/p right lumpectomy, right SLNB on 3/1/2022, final surgical pathology revealed 0.2cm of invasive tumor, all margins negative, 0/1 lymph node negative for disease. The patient met with Dr. Peña to discuss adjuvant systemic therapy; however, given the low risk of her disease, systemic chemotherapy was not recommended. Patient completed XRT therapy with Dr. Dawson on 5/18/2022 and is overall feeling well without complaint. \par \par The patient has a history of left breast DCIS, ER+, s/p lumpectomy & XRT in 2000. She is is s/p a 20 year course of Letrozole, completed in 2020.

## 2023-07-28 NOTE — PHYSICAL EXAM
[Normocephalic] : normocephalic [No Supraclavicular Adenopathy] : no supraclavicular adenopathy [Examined in the supine and seated position] : examined in the supine and seated position [Symmetrical] : symmetrical [No dominant masses] : no dominant masses in right breast  [No dominant masses] : no dominant masses left breast [No Nipple Retraction] : no left nipple retraction [No Nipple Discharge] : no left nipple discharge [No Axillary Lymphadenopathy] : no left axillary lymphadenopathy [No Edema] : no edema [No Rashes] : no rashes [No Ulceration] : no ulceration [Breast Nipple Inversion] : nipples not inverted [Breast Nipple Retraction] : nipples not retracted [Breast Nipple Flattening] : nipples not flattened [Breast Nipple Fissures] : nipples not fissured [de-identified] : surgical incisions to superior breast and axilla are well healed, mild tissue thickening at surgical site, no dominant masses

## 2023-07-28 NOTE — ASSESSMENT
[FreeTextEntry1] : 89 yo female with a history of breast cancer presents for survivorship. She is doing well without complaint. Will be traveling to [place] in September with her daughter. Reviewed surveillance plan, mammogram and ultrasound in January & RTC after.  The patient verbalized understanding and is in agreement with the plan.

## 2023-07-28 NOTE — DATA REVIEWED
[FreeTextEntry1] : --12/18/19 ([facility]) b/l breast mmg & US: heterogenously dense breasts. Benign mmg findings w/o interval change. Negative b/l US. BI-RADS 2. \par \par -- 1/13/21 ([facility]) b/l mmg & US: benign mmg w/o interval change; negative b/l US. BI-RADS 2. \par \par 1/18/22 ([facility]) heterogenously dense. New suspicious linear calcifications right breast. BI-RADS 4B.\par \par 1/26/22 ([facility]) Right breast  12:00, posterior, stereotactic core biopsy: Ductal carcinoma in situ, high nuclear grade, with foci highly suspicious for microinvasion, with central comedo-type necrosis and associated microcalcifications\par ADDENDUM: ESTROGEN RECEPTOR:  NEGATIVE  ( 0 STAINING)\par PROGESTERONE RECEPTOR: NEGATIVE  (0 STAINING)\par HER-2: NEGATIVE  \par ADDENDUM: Deeper levels and immunohistochemical stains were ordered on slide 1B to rule out microinvasion. However, on the deeper levels some of the ducts, which had previously appeared to have ductal carcinoma in situ, are now more irregularly shaped and invasive-appearing.  Immunohistochemistry for p63, SMMH and MSA confirms the absence of myoepithelial cells in these irregular ducts and thus they are invasive carcinoma. Other ducts remain as in situ carcinoma. Right breast, stereotactic core biopsy: INVASIVE DUCTAL CARCINOMA, HIGH GRADE, 4 MM ON THIS SLIDE\par \par 2/7/22 ([facility])b/l mmg & US:  heterogenously dense. 1.  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  Needle localization can be performed with mammography guidance. 2.  Unremarkable bilateral contrast mammography exam. 3.  No evidence of adenopathy in either axilla. BI-RADS 6. \par \par 2/9/22 ([facility]) genetic testing: negative. \par \par 3/1/22 ([facility]) right lumpectomy w/ SLN: 1. Breast, right; excision: - Invasive ductal carcinoma, poorly differentiated, 2 mm, see note and synoptic report - All margins over 5 mm - Biopsy site changes- Calcifications associated with wall of blood vessels and benign epithelium\par 2. Axilla, right, sentinel lymph node; biopsy: - One lymph node, negative for metastatic carcinoma (0/1)\par 3. Breast, new superior margin, excision: - Benign fibroadipose tissue\par 4. Breast, new inferior margin, excision: - Benign breast tissue - Calcifications associated with benign epithelium\par 5. Breast, new inferior margin #2; excision: - Benign breast tissue\par 6. Breast, right, new medial margin; excision: - Benign breast tissue\par 7. Breast, right, new lateral margin; excision: - Benign breast tissue\par 8. Breast, right, new anterior margin; excision: - Benign fibroadipose tissue \par \par 1/3/23 ([facility]) B/l sMmg and US: heterogenously dense. No suspicious findings. Fat necrosis in the right upper outer quadrant. Recommend mammo and US in 1 year. BI-RADS 2.

## 2023-09-25 NOTE — DISCHARGE NOTE NURSING/CASE MANAGEMENT/SOCIAL WORK - NURSING SECTION COMPLETE
Gen: WDWN, NAD  HEENT: EOMI, no nasal discharge, mucous membranes moist  CV: RRR, +S1/S2, no M/R/G, 2+ radial pulses b/l  Resp: CTAB, no W/R/R, no accessory muscle use, no increased work of breathing  GI: Abdomen soft non-distended, NTTP. +Minor Rt flank pain.   MSK: No open wounds, no bruising, no LE edema  Neuro: A&Ox4, following commands, moving all four extremities spontaneously  Psych: appropriate mood Patient/Caregiver provided printed discharge information.

## 2024-01-02 NOTE — ASU DISCHARGE PLAN (ADULT/PEDIATRIC) - CARE PROVIDER_API CALL
Estela Haile (DO)  Surgery  [address] [place] [address] [phone]  Phone: [phone]  Fax: [phone]  Follow Up Time:   
English

## 2024-01-11 ENCOUNTER — APPOINTMENT (OUTPATIENT)
Dept: ULTRASOUND IMAGING | Facility: HOSPITAL | Age: 89
End: 2024-01-11
Payer: MEDICARE

## 2024-01-11 ENCOUNTER — RESULT REVIEW (OUTPATIENT)
Age: 89
End: 2024-01-11

## 2024-01-11 ENCOUNTER — APPOINTMENT (OUTPATIENT)
Dept: MAMMOGRAPHY | Facility: HOSPITAL | Age: 89
End: 2024-01-11
Payer: MEDICARE

## 2024-01-11 ENCOUNTER — OUTPATIENT (OUTPATIENT)
Dept: OUTPATIENT SERVICES | Facility: HOSPITAL | Age: 89
LOS: 1 days | End: 2024-01-11
Payer: MEDICARE

## 2024-01-11 DIAGNOSIS — Z98.890 OTHER SPECIFIED POSTPROCEDURAL STATES: Chronic | ICD-10-CM

## 2024-01-11 DIAGNOSIS — E89.0 POSTPROCEDURAL HYPOTHYROIDISM: Chronic | ICD-10-CM

## 2024-01-11 PROCEDURE — 76641 ULTRASOUND BREAST COMPLETE: CPT

## 2024-01-11 PROCEDURE — 76641 ULTRASOUND BREAST COMPLETE: CPT | Mod: 26,50,3G

## 2024-01-11 PROCEDURE — G0279: CPT | Mod: 26

## 2024-01-11 PROCEDURE — 77066 DX MAMMO INCL CAD BI: CPT | Mod: 26

## 2024-01-11 PROCEDURE — 77066 DX MAMMO INCL CAD BI: CPT

## 2024-01-11 PROCEDURE — G0279: CPT

## 2024-01-26 ENCOUNTER — APPOINTMENT (OUTPATIENT)
Dept: BREAST CENTER | Facility: CLINIC | Age: 89
End: 2024-01-26
Payer: MEDICARE

## 2024-01-26 VITALS
HEIGHT: 66 IN | WEIGHT: 151 LBS | BODY MASS INDEX: 24.27 KG/M2 | SYSTOLIC BLOOD PRESSURE: 117 MMHG | DIASTOLIC BLOOD PRESSURE: 80 MMHG | HEART RATE: 63 BPM

## 2024-01-26 DIAGNOSIS — C50.911 MALIGNANT NEOPLASM OF UNSPECIFIED SITE OF RIGHT FEMALE BREAST: ICD-10-CM

## 2024-01-26 DIAGNOSIS — Z86.000 PERSONAL HISTORY OF IN-SITU NEOPLASM OF BREAST: ICD-10-CM

## 2024-01-26 PROCEDURE — 99213 OFFICE O/P EST LOW 20 MIN: CPT

## 2024-01-26 NOTE — ASSESSMENT
[FreeTextEntry1] : 88-year-old female with a history of left DCIS in 2000 and right IDC in 2022 presents for survivorship.  The patient is overall doing very well, and is without complaint.  Reviewed the results of her recent mammogram and ultrasound, which was benign.  Discussed continued mammograms annually as long as she remains in good health.  Given that she completed treatment in 2022 I recommend clinical breast exams every 6 months for now.  Will plan on seeing her again in July.  All questions were answered; the patient verbalized understanding and is in agreement with the plan.

## 2024-01-26 NOTE — HISTORY OF PRESENT ILLNESS
[FreeTextEntry1] : Wilda is an 89 yo female presents for follow up of right invasive ductal carcinoma, ER/ME/HER-2 negative s/p right lumpectomy, right SLNB on 3/1/2022, final surgical pathology revealed 0.2cm of invasive tumor, all margins negative, 0/1 lymph node negative for disease. The patient met with Dr. Peña to discuss adjuvant systemic therapy; however, given the low risk of her disease, systemic chemotherapy was not recommended. Patient completed XRT therapy with Dr. Dawson on 5/18/2022 and is overall feeling well without complaint. Pt denies palpable masses, skin lesions/changes, or nipple discharge.  The patient has a history of left breast DCIS, ER+, s/p lumpectomy & XRT in 2000. She is is s/p a 20 year course of Letrozole, completed in 2020.   She is overall doing really well; she traveled to [place] in September for couple of weeks. She states that recently she has been getting a little out of breath when she walks too fast, she states that she is scheduled for an echocardiogram.

## 2024-01-26 NOTE — PHYSICAL EXAM
[Normocephalic] : normocephalic [No Supraclavicular Adenopathy] : no supraclavicular adenopathy [Examined in the supine and seated position] : examined in the supine and seated position [Symmetrical] : symmetrical [No dominant masses] : no dominant masses left breast [No dominant masses] : no dominant masses in right breast  [No Nipple Retraction] : no left nipple retraction [No Nipple Discharge] : no left nipple discharge [No Axillary Lymphadenopathy] : no left axillary lymphadenopathy [No Edema] : no edema [No Rashes] : no rashes [No Ulceration] : no ulceration [Breast Nipple Inversion] : nipples not inverted [Breast Nipple Retraction] : nipples not retracted [Breast Nipple Flattening] : nipples not flattened [Breast Nipple Fissures] : nipples not fissured [de-identified] : surgical incisions to superior breast and axilla are well healed, mild tissue thickening at surgical site, no dominant masses

## 2024-01-26 NOTE — DATA REVIEWED
[FreeTextEntry1] : --12/18/19 ([facility]) b/l breast mmg & US: heterogenously dense breasts. Benign mmg findings w/o interval change. Negative b/l US. BI-RADS 2.   -- 1/13/21 ([facility]) b/l mmg & US: benign mmg w/o interval change; negative b/l US. BI-RADS 2.   1/18/22 ([facility]) heterogenously dense. New suspicious linear calcifications right breast. BI-RADS 4B.  1/26/22 ([facility]) Right breast  12:00, posterior, stereotactic core biopsy: Ductal carcinoma in situ, high nuclear grade, with foci highly suspicious for microinvasion, with central comedo-type necrosis and associated microcalcifications ADDENDUM: ESTROGEN RECEPTOR:  NEGATIVE  ( 0 STAINING) PROGESTERONE RECEPTOR: NEGATIVE  (0 STAINING) HER-2: NEGATIVE   ADDENDUM: Deeper levels and immunohistochemical stains were ordered on slide 1B to rule out microinvasion. However, on the deeper levels some of the ducts, which had previously appeared to have ductal carcinoma in situ, are now more irregularly shaped and invasive-appearing.  Immunohistochemistry for p63, SMMH and MSA confirms the absence of myoepithelial cells in these irregular ducts and thus they are invasive carcinoma. Other ducts remain as in situ carcinoma. Right breast, stereotactic core biopsy: INVASIVE DUCTAL CARCINOMA, HIGH GRADE, 4 MM ON THIS SLIDE  2/7/22 ([facility])b/l mmg & US:  heterogenously dense. 1.  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  Needle localization can be performed with mammography guidance. 2.  Unremarkable bilateral contrast mammography exam. 3.  No evidence of adenopathy in either axilla. BI-RADS 6.   2/9/22 ([facility]) genetic testing: negative.   3/1/22 ([facility]) right lumpectomy w/ SLN: 1. Breast, right; excision: - Invasive ductal carcinoma, poorly differentiated, 2 mm, see note and synoptic report - All margins over 5 mm - Biopsy site changes- Calcifications associated with wall of blood vessels and benign epithelium 2. Axilla, right, sentinel lymph node; biopsy: - One lymph node, negative for metastatic carcinoma (0/1) 3. Breast, new superior margin, excision: - Benign fibroadipose tissue 4. Breast, new inferior margin, excision: - Benign breast tissue - Calcifications associated with benign epithelium 5. Breast, new inferior margin #2; excision: - Benign breast tissue 6. Breast, right, new medial margin; excision: - Benign breast tissue 7. Breast, right, new lateral margin; excision: - Benign breast tissue 8. Breast, right, new anterior margin; excision: - Benign fibroadipose tissue   1/3/23 ([facility]) B/l sMmg and US: heterogenously dense. No suspicious findings. Fat necrosis in the right upper outer quadrant. Recommend mammo and US in 1 year. BI-RADS 2.   1/11/24 ([facility]) b/l diag mammo and US: heterogeneously dense. No evidence of malignancy. BI-RADS 2.

## 2024-01-26 NOTE — PAST MEDICAL HISTORY
[Postmenopausal] : The patient is postmenopausal [Menarche Age ____] : age at menarche was [unfilled] [Menopause Age____] : age at menopause was [unfilled] [Total Preg ___] : G[unfilled] [Approximately ___] : the LMP was approximately [unfilled] [Live Births ___] : P[unfilled]  [AB Spont ___] : miscarriages: [unfilled]  [Age At Live Birth ___] : Age at live birth: [unfilled] [History of Hormone Replacement Treatment] : has no history of hormone replacement treatment [FreeTextEntry5] : None [FreeTextEntry2] : Gave birth to fraternal twins [FreeTextEntry6] : None [FreeTextEntry7] : None [FreeTextEntry8] : 3 months

## 2024-01-26 NOTE — REVIEW OF SYSTEMS
[Negative] : Endocrine [Fever] : no fever [Chills] : no chills [Feeling Poorly] : not feeling poorly [Feeling Tired] : not feeling tired [Skin Lesions] : no skin lesions [Skin Wound] : no skin wound [Breast Pain] : no breast pain [Breast Lump] : no breast lump

## 2024-06-11 ENCOUNTER — APPOINTMENT (OUTPATIENT)
Dept: RADIATION ONCOLOGY | Facility: CLINIC | Age: 89
End: 2024-06-11
Payer: MEDICARE

## 2024-06-11 VITALS
SYSTOLIC BLOOD PRESSURE: 163 MMHG | TEMPERATURE: 97.3 F | DIASTOLIC BLOOD PRESSURE: 85 MMHG | HEART RATE: 66 BPM | WEIGHT: 151.9 LBS | BODY MASS INDEX: 24.52 KG/M2 | OXYGEN SATURATION: 95 %

## 2024-06-11 PROCEDURE — 99215 OFFICE O/P EST HI 40 MIN: CPT

## 2024-06-11 PROCEDURE — G2211 COMPLEX E/M VISIT ADD ON: CPT

## 2024-06-11 RX ORDER — CANDESARTAN CILEXETIL 4 MG/1
4 TABLET ORAL
Refills: 0 | Status: ACTIVE | COMMUNITY
Start: 2019-10-18

## 2024-06-11 RX ORDER — METOPROLOL SUCCINATE 100 MG/1
100 TABLET, EXTENDED RELEASE ORAL
Refills: 0 | Status: ACTIVE | COMMUNITY
Start: 2019-11-22

## 2024-06-11 NOTE — HISTORY OF PRESENT ILLNESS
[FreeTextEntry1] : Ms. 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 On 3/1/22 she is s/p Right Lumpectomy per Dr. Haile with negative margins and (0/1 LN negative). On 05/18/2022 she completed 5240cGy to the Right Breast.  6/11/24: RPA MGUS 1/11/24: BIRADS 2. Ms. Alfred reports feeling well. She denies fatigue. Appetite is good. No breast pain. ROM is good. Her next f/u for breast sx 7/5/24.   6/6/2023 - RPA - pt presents for follow up. Patient reports feeling well overall. Endorses intermittent shooting sensation in breast that resolves without intervention. Will meet with breast surg NP next month to discuss schedule of future imaging.  1/5/2023 - RPA - pt presents for follow-up. Next appt with breast surg 1/27/23. Reports feeling well overall. Denies pain, nipple discharge, swelling, fatigue, or loss of appetite. Not currently on endocrine therapy. MMG from 1/3/23 - BI-RADS 2.  On assessment, the breast is soft and warm to touch, skin color is consistent with tone. She is using Cerave daily.   07/21/2022- PTE- Today she presents for follow up and states she overall feels well, denies fatigue. The RIGHT breast/axilla area shows no appreciable symptomatology related to her adjuvant radiation therapy, without appreciable hyperpigmentation, erythema, and desquamation. RIGHT breast/axilla area skin is clean, dry & intact. No palpable mass or induration noted. Pt denies any breast pain or pain otherwise and denies nipple discharge. No upper extremity edema noted. On 3/29/22 she consulted with Dr. Peña and pt not a candidate for endocrine therapy. She f/u with Dr. Haile on 6/13/22 and next US/Mammogram planed for Jan 2023.     Oncological Hx: Prior history of Left microinvasive ductal carcinoma s/p 8/24/2000 left lumpectomy and left ALD (Audelia Hogan @ [facility]) + XRT in 2000 (Unable to obtain RT records from [facility]). Letrozole [id number].   --12/18/19 ([facility]) b/l breast mmg & US: heterogenously dense breasts. Benign mmg findings w/o interval change. Negative b/l US. BI-RADS 2.   -- 1/13/21 ([facility]) b/l mmg & US: benign mmg w/o interval change; negative b/l US. BI-RADS 2.   1/18/22 ([facility]) heterogenously dense. New suspicious linear calcifications right breast. BI-RADS 4B.  1/26/22 ([facility]) Right breast 12:00, posterior, stereotactic core biopsy: Ductal carcinoma in situ, high nuclear grade, with foci highly suspicious for microinvasion, with central comedo-type necrosis and associated microcalcifications ADDENDUM: ESTROGEN RECEPTOR: NEGATIVE ( 0 STAINING) PROGESTERONE RECEPTOR: NEGATIVE (0 STAINING) HER-2: NEGATIVE  ADDENDUM: Deeper levels and immunohistochemical stains were ordered on slide 1B to rule out microinvasion. However, on the deeper levels some of the ducts, which had previously appeared to have ductal carcinoma in situ, are now more irregularly shaped and invasive-appearing. Immunohistochemistry for p63, SMMH and MSA confirms the absence of myoepithelial cells in these irregular ducts and thus they are invasive carcinoma. Other ducts remain as in situ carcinoma. Right breast, stereotactic core biopsy: INVASIVE DUCTAL CARCINOMA, HIGH GRADE, 4 MM ON THIS SLIDE  2/7/22 ([facility])b/l mmg & US: heterogenously dense. 1. 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 Needle localization can be performed with mammography guidance. 2. Unremarkable bilateral contrast mammography exam. 3. No evidence of adenopathy in either axilla. BI-RADS 6.   2/9/22 ([facility]) genetic testing: negative.   3/1/22-  Right lumpectomy w/ SLN per Dr. Haile Final Diagnosis 1. Breast, right; excision: - Invasive ductal carcinoma, poorly differentiated, 2 mm, see note and synoptic report - All margins over 5 mm - Biopsy site changes - Calcifications associated with wall of blood vessels and benign epithelium  2. Axilla, right, sentinel lymph node; biopsy: - One lymph node, negative for metastatic carcinoma (0/1)  3. Breast, new superior margin, excision: - Benign fibroadipose tissue  4. Breast, new inferior margin, excision: - Benign breast tissue - Calcifications associated with benign epithelium  5. Breast, new inferior margin #2; excision: - Benign breast tissue  6. Breast, right, new medial margin; excision: - Benign breast tissue  7. Breast, right, new lateral margin; excision: - Benign breast tissue  8. Breast, right, new anterior margin; excision: - Benign fibroadipose tissue ___________________________________________  Synoptic Summary  INVASIVE CARCINOMA OF THE BREAST: Resection SPECIMEN Excision (less than total mastectomy) Procedure: Specimen Laterality:  Right TUMOR Histologic Type:  Invasive carcinoma of no special type (ductal) Glandular (Acinar) / Tubular Differentiation:   Score 3 Nuclear Pleomorphism:  Score 3 Mitotic Rate:  Score 3 Overall Grade:  Grade 3 (scores of 8 or 9) Tumor Size: Greatest dimension of largest invasive focus (Millimeters) - 4 mm Ductal Carcinoma In Situ (DCIS):   Not identified Tumor Extent Tumor Extent:  Not applicable Treatment Effect in the Breast:   No known presurgical therapy Treatment Effect in the Lymph Nodes:   Not applicable MARGINS Margin Status for Invasive Carcinoma:   All margins negative for invasive carcinoma Distance from Invasive Carcinoma to Closest Margin: Greater than -     5 mm Margin Status for DCIS:  Not applicable (no DCIS in specimen) REGIONAL LYMPH NODES Regional Lymph Node Status:  All regional lymph nodes negative for tumor Total Number of Lymph Nodes Examined (sentinel and non-sentinel):     1 Number of [place] Nodes Examined:   1 DISTANT METASTASIS Distant Site(s) Involved:  Not applicable PATHOLOGIC STAGE CLASSIFICATION (pTNM, AJCC 8th Edition) Reporting of pT, pN, and (when applicable) pM categories is based on information available to the pathologist at the time the report is issued. As per the AJCC (Chapter 1, 8th Ed.) it is the managing physician's responsibility to establish the final pathologic stage based upon all pertinent information, including but potentially not limited to this pathology report. TNM Descriptors: Not applicable pT Category:  pT1a Regional Lymph Nodes Modifier:   (sn): [place] node(s) evaluated. pN Category:  pN0 pM Category:  Not applicable - pM cannot be determined from the submitted specimen(s) Breast Biomarker Testing Performed on Previous Biopsy: Estrogen Receptor (ER) Estrogen Receptor (ER) Status:   Negative Progesterone Receptor (PgR) Progesterone Receptor (PgR) Status:   Negative HER2 (by immunohistochemistry) HER2 (by immunohistochemistry):   Negative (Score 0) Testing Performed on Case Number:   [id number]   Today she presents for a consult regarding radiation therapy, referred by Dr. Haile. She had radiation delivered to the left breast approximately 20 years ago, and, after contacting that facility, they indicated these records are no longer available.  Her previous breast surgeon has passed away and there are no past radiation records available for review.  She is healing well from surgery.   The Right Breast/Axilla area skin is clean, dry and intact and incision sites are well approximated and well healed, one clear suture noted on chest incision, pt instructed per surgery team that it will fall out with time. Pt denies any breast pain or pain otherwise and denies nipple discharge. No upper extremity edema noted. Pt educated on the importance of  Aquaphor usage to the treated breast area during radiation therapy 2-3 times per day. Pt plans for consult with Dr. Peña on 3/29/22.

## 2024-06-11 NOTE — PHYSICAL EXAM
[Normal] : well developed, well nourished, in no acute distress [Hearing Threshold Finger Rub Not [place]] : hearing was normal [] : no respiratory distress [Exaggerated Use Of Accessory Muscles For Inspiration] : no accessory muscle use [Nondistended] : nondistended [Supraclavicular Lymph Nodes Enlarged Bilaterally] : supraclavicular [Axillary Lymph Nodes Enlarged Bilaterally] : axillary [de-identified] : Well healed R breast incisions, no abnormal masses palpated. Well healed remote L breast incision  [de-identified] : R arm stiffness due to frozen shoulder

## 2024-06-11 NOTE — DISEASE MANAGEMENT
[Pathological] : TNM Stage: p [IA] : IA [TTNM] : 1a [NTNM] : 0 [MTNM] : x [de-identified] : 5240cGy [de-identified] : 5240cGy [de-identified] : Right Breast

## 2024-07-05 ENCOUNTER — APPOINTMENT (OUTPATIENT)
Dept: BREAST CENTER | Facility: CLINIC | Age: 89
End: 2024-07-05
Payer: MEDICARE

## 2024-07-05 VITALS
SYSTOLIC BLOOD PRESSURE: 149 MMHG | HEART RATE: 63 BPM | BODY MASS INDEX: 23.78 KG/M2 | HEIGHT: 66 IN | WEIGHT: 148 LBS | DIASTOLIC BLOOD PRESSURE: 87 MMHG

## 2024-07-05 DIAGNOSIS — C50.911 MALIGNANT NEOPLASM OF UNSPECIFIED SITE OF RIGHT FEMALE BREAST: ICD-10-CM

## 2024-07-05 DIAGNOSIS — Z78.9 OTHER SPECIFIED HEALTH STATUS: ICD-10-CM

## 2024-07-05 DIAGNOSIS — Z86.000 PERSONAL HISTORY OF IN-SITU NEOPLASM OF BREAST: ICD-10-CM

## 2024-07-05 PROCEDURE — 99213 OFFICE O/P EST LOW 20 MIN: CPT

## 2025-01-14 ENCOUNTER — APPOINTMENT (OUTPATIENT)
Dept: MAMMOGRAPHY | Facility: CLINIC | Age: 89
End: 2025-01-14
Payer: MEDICARE

## 2025-01-14 ENCOUNTER — APPOINTMENT (OUTPATIENT)
Dept: ULTRASOUND IMAGING | Facility: CLINIC | Age: 89
End: 2025-01-14

## 2025-01-14 ENCOUNTER — RESULT REVIEW (OUTPATIENT)
Age: 89
End: 2025-01-14

## 2025-01-14 PROCEDURE — 77066 DX MAMMO INCL CAD BI: CPT

## 2025-01-14 PROCEDURE — 76641 ULTRASOUND BREAST COMPLETE: CPT | Mod: 50,GA

## 2025-01-14 PROCEDURE — G0279: CPT

## 2025-01-24 ENCOUNTER — APPOINTMENT (OUTPATIENT)
Dept: BREAST CENTER | Facility: CLINIC | Age: 89
End: 2025-01-24
Payer: MEDICARE

## 2025-01-24 VITALS
WEIGHT: 149 LBS | HEART RATE: 60 BPM | BODY MASS INDEX: 23.95 KG/M2 | HEIGHT: 66 IN | DIASTOLIC BLOOD PRESSURE: 86 MMHG | SYSTOLIC BLOOD PRESSURE: 124 MMHG

## 2025-01-24 DIAGNOSIS — Z85.3 PERSONAL HISTORY OF MALIGNANT NEOPLASM OF BREAST: ICD-10-CM

## 2025-01-24 DIAGNOSIS — Z85.3 ENCOUNTER FOR FOLLOW-UP EXAMINATION AFTER COMPLETED TREATMENT FOR MALIGNANT NEOPLASM: ICD-10-CM

## 2025-01-24 DIAGNOSIS — R92.30 DENSE BREASTS, UNSPECIFIED: ICD-10-CM

## 2025-01-24 DIAGNOSIS — Z08 ENCOUNTER FOR FOLLOW-UP EXAMINATION AFTER COMPLETED TREATMENT FOR MALIGNANT NEOPLASM: ICD-10-CM

## 2025-01-24 PROCEDURE — 99213 OFFICE O/P EST LOW 20 MIN: CPT

## 2025-06-17 ENCOUNTER — APPOINTMENT (OUTPATIENT)
Dept: RADIATION ONCOLOGY | Facility: CLINIC | Age: 89
End: 2025-06-17

## 2025-06-17 VITALS
DIASTOLIC BLOOD PRESSURE: 79 MMHG | WEIGHT: 153.5 LBS | BODY MASS INDEX: 24.78 KG/M2 | HEART RATE: 53 BPM | TEMPERATURE: 97.6 F | OXYGEN SATURATION: 98 % | SYSTOLIC BLOOD PRESSURE: 153 MMHG

## 2025-06-17 PROCEDURE — 99215 OFFICE O/P EST HI 40 MIN: CPT

## 2025-06-17 PROCEDURE — G2211 COMPLEX E/M VISIT ADD ON: CPT

## (undated) DEVICE — DRSG TELFA 3 X 8

## (undated) DEVICE — SUT SILK 3-0 30" SH

## (undated) DEVICE — DRSG STERISTRIPS 0.5 X 4"

## (undated) DEVICE — SUT SILK 2-0 30" PSL

## (undated) DEVICE — GLV 7 PROTEXIS (WHITE)

## (undated) DEVICE — SUT MONOCRYL 5-0 18" P-3 UNDYED

## (undated) DEVICE — DRAPE LIGHT HANDLE COVER (BLUE)

## (undated) DEVICE — DRSG BREAST BINDER PINK FLORAL MED

## (undated) DEVICE — BLADE SCALPEL SAFETY #15 WITH PLASTIC GREEN HANDLE

## (undated) DEVICE — PROBE MARGIN

## (undated) DEVICE — GLV 6.5 PROTEXIS (WHITE)

## (undated) DEVICE — DRAPE PROBE COVER 5" X 96"

## (undated) DEVICE — INVUITY ILLUMINATOR EIGR WAVEGUIDE, WIDE/FLAT DISP

## (undated) DEVICE — NDL HYPO SAFE 25G X 1.5" (ORANGE)

## (undated) DEVICE — SUT VICRYL 4-0 27" SH

## (undated) DEVICE — BLADE PHOTON 7.5IN / 10.5IN

## (undated) DEVICE — DRAPE NAVIGATOR COVER

## (undated) DEVICE — PACK BREAST RECONSTRUCTION

## (undated) DEVICE — PREP CHLORAPREP HI-LITE ORANGE 26ML

## (undated) DEVICE — DRAPE TOWEL BLUE 17" X 24"

## (undated) DEVICE — DRAPE MAYO STAND 30"

## (undated) DEVICE — DRSG KERLIX ROLL 4.5"